# Patient Record
Sex: FEMALE | Race: WHITE | NOT HISPANIC OR LATINO | Employment: UNEMPLOYED | URBAN - METROPOLITAN AREA
[De-identification: names, ages, dates, MRNs, and addresses within clinical notes are randomized per-mention and may not be internally consistent; named-entity substitution may affect disease eponyms.]

---

## 2017-05-12 ENCOUNTER — APPOINTMENT (OUTPATIENT)
Dept: RADIOLOGY | Facility: MEDICAL CENTER | Age: 27
End: 2017-05-12
Attending: EMERGENCY MEDICINE
Payer: COMMERCIAL

## 2017-05-12 ENCOUNTER — HOSPITAL ENCOUNTER (EMERGENCY)
Facility: MEDICAL CENTER | Age: 27
End: 2017-05-12
Attending: EMERGENCY MEDICINE
Payer: COMMERCIAL

## 2017-05-12 VITALS
RESPIRATION RATE: 16 BRPM | TEMPERATURE: 99.3 F | OXYGEN SATURATION: 100 % | BODY MASS INDEX: 24.33 KG/M2 | WEIGHT: 155 LBS | HEIGHT: 67 IN | DIASTOLIC BLOOD PRESSURE: 104 MMHG | SYSTOLIC BLOOD PRESSURE: 145 MMHG | HEART RATE: 101 BPM

## 2017-05-12 DIAGNOSIS — F11.10 HEROIN ABUSE (HCC): ICD-10-CM

## 2017-05-12 DIAGNOSIS — T07.XXXA: ICD-10-CM

## 2017-05-12 LAB — HCG SERPL QL: NEGATIVE

## 2017-05-12 PROCEDURE — 70450 CT HEAD/BRAIN W/O DYE: CPT

## 2017-05-12 PROCEDURE — 72125 CT NECK SPINE W/O DYE: CPT

## 2017-05-12 PROCEDURE — 36415 COLL VENOUS BLD VENIPUNCTURE: CPT

## 2017-05-12 PROCEDURE — 84703 CHORIONIC GONADOTROPIN ASSAY: CPT

## 2017-05-12 PROCEDURE — 700105 HCHG RX REV CODE 258: Performed by: EMERGENCY MEDICINE

## 2017-05-12 PROCEDURE — 73120 X-RAY EXAM OF HAND: CPT | Mod: RT

## 2017-05-12 PROCEDURE — 700101 HCHG RX REV CODE 250: Performed by: EMERGENCY MEDICINE

## 2017-05-12 PROCEDURE — 96365 THER/PROPH/DIAG IV INF INIT: CPT

## 2017-05-12 PROCEDURE — 99285 EMERGENCY DEPT VISIT HI MDM: CPT

## 2017-05-12 PROCEDURE — 70486 CT MAXILLOFACIAL W/O DYE: CPT

## 2017-05-12 PROCEDURE — 700111 HCHG RX REV CODE 636 W/ 250 OVERRIDE (IP): Performed by: EMERGENCY MEDICINE

## 2017-05-12 PROCEDURE — 303747 HCHG EXTRA SUTURE

## 2017-05-12 PROCEDURE — 304999 HCHG REPAIR-SIMPLE/INTERMED LEVEL 1

## 2017-05-12 PROCEDURE — 304217 HCHG IRRIGATION SYSTEM

## 2017-05-12 RX ORDER — SODIUM CHLORIDE 9 MG/ML
1000 INJECTION, SOLUTION INTRAVENOUS ONCE
Status: COMPLETED | OUTPATIENT
Start: 2017-05-12 | End: 2017-05-12

## 2017-05-12 RX ORDER — AMPICILLIN AND SULBACTAM 2; 1 G/1; G/1
3 INJECTION, POWDER, FOR SOLUTION INTRAMUSCULAR; INTRAVENOUS ONCE
Status: COMPLETED | OUTPATIENT
Start: 2017-05-12 | End: 2017-05-12

## 2017-05-12 RX ORDER — AMOXICILLIN AND CLAVULANATE POTASSIUM 875; 125 MG/1; MG/1
1 TABLET, FILM COATED ORAL 2 TIMES DAILY
Qty: 14 TAB | Refills: 0 | Status: SHIPPED | OUTPATIENT
Start: 2017-05-12 | End: 2017-05-12

## 2017-05-12 RX ORDER — LIDOCAINE HYDROCHLORIDE 20 MG/ML
20 INJECTION, SOLUTION INFILTRATION; PERINEURAL ONCE
Status: COMPLETED | OUTPATIENT
Start: 2017-05-12 | End: 2017-05-12

## 2017-05-12 RX ORDER — AMOXICILLIN AND CLAVULANATE POTASSIUM 875; 125 MG/1; MG/1
1 TABLET, FILM COATED ORAL 2 TIMES DAILY
Qty: 14 TAB | Refills: 0 | Status: SHIPPED | OUTPATIENT
Start: 2017-05-12 | End: 2017-05-19

## 2017-05-12 RX ADMIN — LIDOCAINE HYDROCHLORIDE 20 ML: 20 INJECTION, SOLUTION INFILTRATION; PERINEURAL at 06:45

## 2017-05-12 RX ADMIN — SODIUM CHLORIDE 1000 ML: 9 INJECTION, SOLUTION INTRAVENOUS at 04:35

## 2017-05-12 RX ADMIN — AMPICILLIN SODIUM AND SULBACTAM SODIUM 3 G: 2; 1 INJECTION, POWDER, FOR SOLUTION INTRAMUSCULAR; INTRAVENOUS at 04:35

## 2017-05-12 ASSESSMENT — PAIN SCALES - GENERAL: PAINLEVEL_OUTOF10: 10
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
## 2017-05-12 NOTE — DISCHARGE INSTRUCTIONS
Laceration Care, Adult  A laceration is a cut that goes through all of the layers of the skin and into the tissue that is right under the skin. Some lacerations heal on their own. Others need to be closed with stitches (sutures), staples, skin adhesive strips, or skin glue. Proper laceration care minimizes the risk of infection and helps the laceration to heal better.  HOW TO CARE FOR YOUR LACERATION  If sutures or staples were used:  · Keep the wound clean and dry.  · If you were given a bandage (dressing), you should change it at least one time per day or as told by your health care provider. You should also change it if it becomes wet or dirty.  · Keep the wound completely dry for the first 24 hours or as told by your health care provider. After that time, you may shower or bathe. However, make sure that the wound is not soaked in water until after the sutures or staples have been removed.  · Clean the wound one time each day or as told by your health care provider:  ¨ Wash the wound with soap and water.  ¨ Rinse the wound with water to remove all soap.  ¨ Pat the wound dry with a clean towel. Do not rub the wound.  · After cleaning the wound, apply a thin layer of antibiotic ointment as told by your health care provider. This will help to prevent infection and keep the dressing from sticking to the wound.  · Have the sutures or staples removed as told by your health care provider.  If skin adhesive strips were used:  · Keep the wound clean and dry.  · If you were given a bandage (dressing), you should change it at least one time per day or as told by your health care provider. You should also change it if it becomes dirty or wet.  · Do not get the skin adhesive strips wet. You may shower or bathe, but be careful to keep the wound dry.  · If the wound gets wet, pat it dry with a clean towel. Do not rub the wound.  · Skin adhesive strips fall off on their own. You may trim the strips as the wound heals. Do not  remove skin adhesive strips that are still stuck to the wound. They will fall off in time.  If skin glue was used:  · Try to keep the wound dry, but you may briefly wet it in the shower or bath. Do not soak the wound in water, such as by swimming.  · After you have showered or bathed, gently pat the wound dry with a clean towel. Do not rub the wound.  · Do not do any activities that will make you sweat heavily until the skin glue has fallen off on its own.  · Do not apply liquid, cream, or ointment medicine to the wound while the skin glue is in place. Using those may loosen the film before the wound has healed.  · If you were given a bandage (dressing), you should change it at least one time per day or as told by your health care provider. You should also change it if it becomes dirty or wet.  · If a dressing is placed over the wound, be careful not to apply tape directly over the skin glue. Doing that may cause the glue to be pulled off before the wound has healed.  · Do not pick at the glue. The skin glue usually remains in place for 5-10 days, then it falls off of the skin.  General Instructions  · Take over-the-counter and prescription medicines only as told by your health care provider.  · If you were prescribed an antibiotic medicine or ointment, take or apply it as told by your doctor. Do not stop using it even if your condition improves.  · To help prevent scarring, make sure to cover your wound with sunscreen whenever you are outside after stitches are removed, after adhesive strips are removed, or when glue remains in place and the wound is healed. Make sure to wear a sunscreen of at least 30 SPF.  · Do not scratch or pick at the wound.  · Keep all follow-up visits as told by your health care provider. This is important.  · Check your wound every day for signs of infection. Watch for:  ¨ Redness, swelling, or pain.  ¨ Fluid, blood, or pus.  · Raise (elevate) the injured area above the level of your heart  while you are sitting or lying down, if possible.  SEEK MEDICAL CARE IF:  · You received a tetanus shot and you have swelling, severe pain, redness, or bleeding at the injection site.  · You have a fever.  · A wound that was closed breaks open.  · You notice a bad smell coming from your wound or your dressing.  · You notice something coming out of the wound, such as wood or glass.  · Your pain is not controlled with medicine.  · You have increased redness, swelling, or pain at the site of your wound.  · You have fluid, blood, or pus coming from your wound.  · You notice a change in the color of your skin near your wound.  · You need to change the dressing frequently due to fluid, blood, or pus draining from the wound.  · You develop a new rash.  · You develop numbness around the wound.  SEEK IMMEDIATE MEDICAL CARE IF:  · You develop severe swelling around the wound.  · Your pain suddenly increases and is severe.  · You develop painful lumps near the wound or on skin that is anywhere on your body.  · You have a red streak going away from your wound.  · The wound is on your hand or foot and you cannot properly move a finger or toe.  · The wound is on your hand or foot and you notice that your fingers or toes look pale or bluish.     This information is not intended to replace advice given to you by your health care provider. Make sure you discuss any questions you have with your health care provider.     Document Released: 12/18/2006 Document Revised: 05/03/2016 Document Reviewed: 12/14/2015  [email or web address] Interactive Patient Education ©2016 [email or web address]

## 2017-05-12 NOTE — ED AVS SNAPSHOT
5/12/2017    Liv Vanessa  [address]  [place] [address]    Dear Liv:    [facility] wants to ensure your discharge home is safe and you or your loved ones have had all of your questions answered regarding your care after you leave the hospital.    Below is a list of resources and contact information should you have any questions regarding your hospital stay, follow-up instructions, or active medical symptoms.    Questions or Concerns Regarding… Contact   Medical Questions Related to Your Discharge  (7 days a week, 8am-5pm) Contact a Nurse Care Coordinator   [phone]   Medical Questions Not Related to Your Discharge  (24 hours a day / 7 days a week)  Contact the Nurse Health Line   [phone]    Medications or Discharge Instructions Refer to your discharge packet   or contact your [facility] Primary Care Provider   [phone]   Follow-up Appointment(s) Schedule your appointment via [employer]   or contact Scheduling [phone]   Billing Review your statement via [employer]  or contact Billing [phone]   Medical Records Review your records via [employer]   or contact Medical Records [phone]     You may receive a telephone call within two days of discharge. This call is to make certain you understand your discharge instructions and have the opportunity to have any questions answered. You can also easily access your medical information, test results and upcoming appointments via the [employer] free online health management tool. You can learn more and sign up at [employer]. For assistance setting up your [employer] account, please call [phone].    Once again, we want to ensure your discharge home is safe and that you have a clear understanding of any next steps in your care. If you have any questions or concerns, please do not hesitate to contact us, we are here for you. Thank you for choosing [facility] for your healthcare needs.    Sincerely,    Your [facility] Healthcare Team
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
## 2017-05-12 NOTE — ED AVS SNAPSHOT
Datadecision Access Code: [email or web address]  Expires: 6/11/2017  8:41 AM    Your email address is not on file at [facility].  Email Addresses are required for you to sign up for Datade[employer]ision, please contact [phone] to verify your personal information and to provide your email address prior to attempting to register for Datadecision.    Liv Mendez  [address] [place] [address]    Datadecision  A secure, online tool to manage your health information     [facility]’s Datadecision® is a secure, online tool that connects you to your personalized health information from the privacy of your home -- day or night - making it very easy for you to manage your healthcare. Once the activation process is completed, you can even access your medical information using the Datadecision naa, which is available for free in the Apple Naa store or Google Play store.     To learn more about Datadecision, visit [email or web address]    There are two levels of access available (as shown below):   My Chart Features  [facility] Primary Care Doctor [facility]  Specialists [facility]  Urgent  Care Non-[facility] Primary Care Doctor   Email your healthcare team securely and privately 24/7 X X X    Manage appointments: schedule your next appointment; view details of past/upcoming appointments X      Request prescription refills. X      View recent personal medical records, including lab and immunizations X X X X   View health record, including health history, allergies, medications X X X X   Read reports about your outpatient visits, procedures, consult and ER notes X X X X   See your discharge summary, which is a recap of your hospital and/or ER visit that includes your diagnosis, lab results, and care plan X X  X     How to register for [employer]:  Once your e-mail address has been verified, follow the following steps to sign up for [employer].     1. Go to  [email or web address]  2. Click on the Sign Up Now box, which takes you to the New Member Sign Up page. You will  need to provide the following information:  a. Enter your [employer] Access Code exactly as it appears at the top of this page. (You will not need to use this code after you’ve completed the sign-up process. If you do not sign up before the expiration date, you must request a new code.)   b. Enter your date of birth.   c. Enter your home email address.   d. Click Submit, and follow the next screen’s instructions.  3. Create a Smart [place] ID. This will be your [employer] login ID and cannot be changed, so think of one that is secure and easy to remember.  4. Create a [employer] password. You can change your password at any time.  5. Enter your Password Reset Question and Answer. This can be used at a later time if you forget your password.   6. Enter your e-mail address. This allows you to receive e-mail notifications when new information is available in [employer].  7. Click Sign Up. You can now view your health information.    For assistance activating your [employer] account, call [phone]

## 2017-05-12 NOTE — ED NOTES
Pt given discharge instructions and Rx x1. Pt verbalized understanding. VSS no acute distress noted, pt able to ambulate without difficulty.

## 2017-05-12 NOTE — ED NOTES
[facility] to come and interview pt for intake. Eta 1 hr. Will continued to monitor. erp tech at bedside to clean and dress facial wounds.

## 2017-05-12 NOTE — ED NOTES
Still waiting for [facility] intake interview. Will discharge once completed.

## 2017-05-12 NOTE — ED NOTES
Assessment made. Seen by ERP. Bleeding controlled. IV placed. Blood drawn and sent to lab. Friends at bedside.

## 2017-05-12 NOTE — ED NOTES
Cleaned dried blood on hand and facial wound. Facial wound cleaned to the extend pt would bear due to pain to the touch.

## 2017-05-12 NOTE — ED NOTES
Pt's hand dressed with antibiotic ointment, 4x4 guaze, and keflex per Dr. Figueredo's instructions. Pt. Assisted to bathroom and was able to ambulate with steady gait. Life skills at bedside speaking with pt.
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
## 2017-09-25 ENCOUNTER — HOSPITAL ENCOUNTER (INPATIENT)
Facility: MEDICAL CENTER | Age: 27
LOS: 4 days | DRG: 603 | End: 2017-09-29
Attending: EMERGENCY MEDICINE | Admitting: INTERNAL MEDICINE
Payer: MEDICAID

## 2017-09-25 ENCOUNTER — APPOINTMENT (OUTPATIENT)
Dept: RADIOLOGY | Facility: MEDICAL CENTER | Age: 27
DRG: 603 | End: 2017-09-25
Attending: EMERGENCY MEDICINE
Payer: MEDICAID

## 2017-09-25 ENCOUNTER — RESOLUTE PROFESSIONAL BILLING HOSPITAL PROF FEE (OUTPATIENT)
Dept: HOSPITALIST | Facility: MEDICAL CENTER | Age: 27
End: 2017-09-25
Payer: MEDICAID

## 2017-09-25 DIAGNOSIS — F41.9 ANXIETY: ICD-10-CM

## 2017-09-25 DIAGNOSIS — H10.9 BACTERIAL CONJUNCTIVITIS OF RIGHT EYE: ICD-10-CM

## 2017-09-25 DIAGNOSIS — H05.011 ORBITAL CELLULITIS ON RIGHT: ICD-10-CM

## 2017-09-25 DIAGNOSIS — F32.A DEPRESSION, UNSPECIFIED DEPRESSION TYPE: ICD-10-CM

## 2017-09-25 PROBLEM — L03.213 CELLULITIS OF PERIORBITAL REGION OF BOTH EYES: Status: ACTIVE | Noted: 2017-09-25

## 2017-09-25 LAB
ANION GAP SERPL CALC-SCNC: 9 MMOL/L (ref 0–11.9)
BASOPHILS # BLD AUTO: 0.3 % (ref 0–1.8)
BASOPHILS # BLD: 0.03 K/UL (ref 0–0.12)
BUN SERPL-MCNC: 6 MG/DL (ref 8–22)
CALCIUM SERPL-MCNC: 9.2 MG/DL (ref 8.5–10.5)
CHLORIDE SERPL-SCNC: 100 MMOL/L (ref 96–112)
CO2 SERPL-SCNC: 24 MMOL/L (ref 20–33)
CREAT SERPL-MCNC: 0.6 MG/DL (ref 0.5–1.4)
EOSINOPHIL # BLD AUTO: 0.02 K/UL (ref 0–0.51)
EOSINOPHIL NFR BLD: 0.2 % (ref 0–6.9)
ERYTHROCYTE [DISTWIDTH] IN BLOOD BY AUTOMATED COUNT: 43.3 FL (ref 35.9–50)
GFR SERPL CREATININE-BSD FRML MDRD: >60 ML/MIN/1.73 M 2
GLUCOSE SERPL-MCNC: 147 MG/DL (ref 65–99)
GRAM STN SPEC: NORMAL
HCT VFR BLD AUTO: 40.7 % (ref 37–47)
HGB BLD-MCNC: 13.3 G/DL (ref 12–16)
IMM GRANULOCYTES # BLD AUTO: 0.04 K/UL (ref 0–0.11)
IMM GRANULOCYTES NFR BLD AUTO: 0.3 % (ref 0–0.9)
LYMPHOCYTES # BLD AUTO: 1.27 K/UL (ref 1–4.8)
LYMPHOCYTES NFR BLD: 10.8 % (ref 22–41)
MCH RBC QN AUTO: 27.4 PG (ref 27–33)
MCHC RBC AUTO-ENTMCNC: 32.7 G/DL (ref 33.6–35)
MCV RBC AUTO: 83.7 FL (ref 81.4–97.8)
MONOCYTES # BLD AUTO: 0.54 K/UL (ref 0–0.85)
MONOCYTES NFR BLD AUTO: 4.6 % (ref 0–13.4)
NEUTROPHILS # BLD AUTO: 9.83 K/UL (ref 2–7.15)
NEUTROPHILS NFR BLD: 83.8 % (ref 44–72)
NRBC # BLD AUTO: 0 K/UL
NRBC BLD AUTO-RTO: 0 /100 WBC
PLATELET # BLD AUTO: 286 K/UL (ref 164–446)
PMV BLD AUTO: 11.9 FL (ref 9–12.9)
POTASSIUM SERPL-SCNC: 3.1 MMOL/L (ref 3.6–5.5)
RBC # BLD AUTO: 4.86 M/UL (ref 4.2–5.4)
SIGNIFICANT IND 70042: NORMAL
SITE SITE: NORMAL
SODIUM SERPL-SCNC: 133 MMOL/L (ref 135–145)
SOURCE SOURCE: NORMAL
WBC # BLD AUTO: 11.7 K/UL (ref 4.8–10.8)

## 2017-09-25 PROCEDURE — 70480 CT ORBIT/EAR/FOSSA W/O DYE: CPT

## 2017-09-25 PROCEDURE — 87591 N.GONORRHOEAE DNA AMP PROB: CPT

## 2017-09-25 PROCEDURE — 96367 TX/PROPH/DG ADDL SEQ IV INF: CPT

## 2017-09-25 PROCEDURE — 700111 HCHG RX REV CODE 636 W/ 250 OVERRIDE (IP): Performed by: EMERGENCY MEDICINE

## 2017-09-25 PROCEDURE — 96375 TX/PRO/DX INJ NEW DRUG ADDON: CPT

## 2017-09-25 PROCEDURE — 96365 THER/PROPH/DIAG IV INF INIT: CPT

## 2017-09-25 PROCEDURE — 770006 HCHG ROOM/CARE - MED/SURG/GYN SEMI*

## 2017-09-25 PROCEDURE — 80048 BASIC METABOLIC PNL TOTAL CA: CPT

## 2017-09-25 PROCEDURE — 87491 CHLMYD TRACH DNA AMP PROBE: CPT

## 2017-09-25 PROCEDURE — 87389 HIV-1 AG W/HIV-1&-2 AB AG IA: CPT

## 2017-09-25 PROCEDURE — 99285 EMERGENCY DEPT VISIT HI MDM: CPT

## 2017-09-25 PROCEDURE — 87205 SMEAR GRAM STAIN: CPT

## 2017-09-25 PROCEDURE — 85025 COMPLETE CBC W/AUTO DIFF WBC: CPT

## 2017-09-25 PROCEDURE — 87070 CULTURE OTHR SPECIMN AEROBIC: CPT

## 2017-09-25 PROCEDURE — 700101 HCHG RX REV CODE 250: Performed by: EMERGENCY MEDICINE

## 2017-09-25 RX ORDER — CEFTRIAXONE 1 G/1
1 INJECTION, POWDER, FOR SOLUTION INTRAMUSCULAR; INTRAVENOUS ONCE
Status: COMPLETED | OUTPATIENT
Start: 2017-09-25 | End: 2017-09-25

## 2017-09-25 RX ORDER — CLINDAMYCIN PHOSPHATE 600 MG/50ML
600 INJECTION, SOLUTION INTRAVENOUS ONCE
Status: COMPLETED | OUTPATIENT
Start: 2017-09-25 | End: 2017-09-25

## 2017-09-25 RX ORDER — AMPICILLIN AND SULBACTAM 2; 1 G/1; G/1
3 INJECTION, POWDER, FOR SOLUTION INTRAMUSCULAR; INTRAVENOUS ONCE
Status: DISCONTINUED | OUTPATIENT
Start: 2017-09-25 | End: 2017-09-25

## 2017-09-25 RX ADMIN — HYDROMORPHONE HYDROCHLORIDE 1 MG: 1 INJECTION, SOLUTION INTRAMUSCULAR; INTRAVENOUS; SUBCUTANEOUS at 20:28

## 2017-09-25 RX ADMIN — CEFTRIAXONE SODIUM 1 G: 1 INJECTION, POWDER, FOR SOLUTION INTRAMUSCULAR; INTRAVENOUS at 23:51

## 2017-09-25 RX ADMIN — CLINDAMYCIN IN 5 PERCENT DEXTROSE 600 MG: 12 INJECTION, SOLUTION INTRAVENOUS at 22:08

## 2017-09-26 ENCOUNTER — APPOINTMENT (OUTPATIENT)
Dept: RADIOLOGY | Facility: MEDICAL CENTER | Age: 27
DRG: 603 | End: 2017-09-26
Attending: HOSPITALIST
Payer: MEDICAID

## 2017-09-26 ENCOUNTER — APPOINTMENT (OUTPATIENT)
Dept: RADIOLOGY | Facility: MEDICAL CENTER | Age: 27
DRG: 603 | End: 2017-09-26
Attending: ANESTHESIOLOGY
Payer: MEDICAID

## 2017-09-26 PROBLEM — E87.6 HYPOKALEMIA: Status: ACTIVE | Noted: 2017-09-26

## 2017-09-26 PROBLEM — Z72.0 TOBACCO USE: Status: ACTIVE | Noted: 2017-09-26

## 2017-09-26 PROBLEM — F11.10 HEROIN ABUSE (HCC): Chronic | Status: ACTIVE | Noted: 2017-09-26

## 2017-09-26 LAB
ALBUMIN SERPL BCP-MCNC: 3.5 G/DL (ref 3.2–4.9)
ALBUMIN/GLOB SERPL: 1 G/DL
ALP SERPL-CCNC: 135 U/L (ref 30–99)
ALT SERPL-CCNC: 24 U/L (ref 2–50)
ANION GAP SERPL CALC-SCNC: 9 MMOL/L (ref 0–11.9)
AST SERPL-CCNC: 28 U/L (ref 12–45)
BASOPHILS # BLD AUTO: 0.3 % (ref 0–1.8)
BASOPHILS # BLD: 0.03 K/UL (ref 0–0.12)
BILIRUB SERPL-MCNC: 0.9 MG/DL (ref 0.1–1.5)
BUN SERPL-MCNC: 6 MG/DL (ref 8–22)
CALCIUM SERPL-MCNC: 8.6 MG/DL (ref 8.5–10.5)
CHLORIDE SERPL-SCNC: 102 MMOL/L (ref 96–112)
CO2 SERPL-SCNC: 26 MMOL/L (ref 20–33)
CREAT SERPL-MCNC: 0.62 MG/DL (ref 0.5–1.4)
EOSINOPHIL # BLD AUTO: 0.12 K/UL (ref 0–0.51)
EOSINOPHIL NFR BLD: 1.3 % (ref 0–6.9)
ERYTHROCYTE [DISTWIDTH] IN BLOOD BY AUTOMATED COUNT: 43.2 FL (ref 35.9–50)
ETHANOL BLD-MCNC: 0 G/DL
GFR SERPL CREATININE-BSD FRML MDRD: >60 ML/MIN/1.73 M 2
GLOBULIN SER CALC-MCNC: 3.4 G/DL (ref 1.9–3.5)
GLUCOSE SERPL-MCNC: 106 MG/DL (ref 65–99)
HCG SERPL QL: NEGATIVE
HCT VFR BLD AUTO: 39.2 % (ref 37–47)
HGB BLD-MCNC: 13.1 G/DL (ref 12–16)
HIV 1+2 AB+HIV1 P24 AG SERPL QL IA: NON REACTIVE
IMM GRANULOCYTES # BLD AUTO: 0.04 K/UL (ref 0–0.11)
IMM GRANULOCYTES NFR BLD AUTO: 0.4 % (ref 0–0.9)
INR PPP: 1.1 (ref 0.87–1.13)
LYMPHOCYTES # BLD AUTO: 1.88 K/UL (ref 1–4.8)
LYMPHOCYTES NFR BLD: 19.6 % (ref 22–41)
MAGNESIUM SERPL-MCNC: 1.8 MG/DL (ref 1.5–2.5)
MCH RBC QN AUTO: 28.2 PG (ref 27–33)
MCHC RBC AUTO-ENTMCNC: 33.4 G/DL (ref 33.6–35)
MCV RBC AUTO: 84.5 FL (ref 81.4–97.8)
MONOCYTES # BLD AUTO: 0.59 K/UL (ref 0–0.85)
MONOCYTES NFR BLD AUTO: 6.2 % (ref 0–13.4)
NEUTROPHILS # BLD AUTO: 6.92 K/UL (ref 2–7.15)
NEUTROPHILS NFR BLD: 72.2 % (ref 44–72)
NRBC # BLD AUTO: 0 K/UL
NRBC BLD AUTO-RTO: 0 /100 WBC
PLATELET # BLD AUTO: 274 K/UL (ref 164–446)
PMV BLD AUTO: 12.1 FL (ref 9–12.9)
POTASSIUM SERPL-SCNC: 3.3 MMOL/L (ref 3.6–5.5)
PROT SERPL-MCNC: 6.9 G/DL (ref 6–8.2)
PROTHROMBIN TIME: 14.6 SEC (ref 12–14.6)
RBC # BLD AUTO: 4.64 M/UL (ref 4.2–5.4)
SODIUM SERPL-SCNC: 137 MMOL/L (ref 135–145)
TREPONEMA PALLIDUM IGG+IGM AB [PRESENCE] IN SERUM OR PLASMA BY IMMUNOASSAY: NON REACTIVE
WBC # BLD AUTO: 9.6 K/UL (ref 4.8–10.8)

## 2017-09-26 PROCEDURE — 700111 HCHG RX REV CODE 636 W/ 250 OVERRIDE (IP)

## 2017-09-26 PROCEDURE — A9270 NON-COVERED ITEM OR SERVICE: HCPCS | Performed by: ANESTHESIOLOGY

## 2017-09-26 PROCEDURE — 86780 TREPONEMA PALLIDUM: CPT

## 2017-09-26 PROCEDURE — 700102 HCHG RX REV CODE 250 W/ 637 OVERRIDE(OP): Performed by: ANESTHESIOLOGY

## 2017-09-26 PROCEDURE — 80307 DRUG TEST PRSMV CHEM ANLYZR: CPT

## 2017-09-26 PROCEDURE — A9270 NON-COVERED ITEM OR SERVICE: HCPCS | Performed by: HOSPITALIST

## 2017-09-26 PROCEDURE — 87040 BLOOD CULTURE FOR BACTERIA: CPT

## 2017-09-26 PROCEDURE — 85025 COMPLETE CBC W/AUTO DIFF WBC: CPT

## 2017-09-26 PROCEDURE — 700105 HCHG RX REV CODE 258

## 2017-09-26 PROCEDURE — 700105 HCHG RX REV CODE 258: Performed by: STUDENT IN AN ORGANIZED HEALTH CARE EDUCATION/TRAINING PROGRAM

## 2017-09-26 PROCEDURE — 700111 HCHG RX REV CODE 636 W/ 250 OVERRIDE (IP): Performed by: PHARMACIST

## 2017-09-26 PROCEDURE — 99223 1ST HOSP IP/OBS HIGH 75: CPT | Mod: GC | Performed by: INTERNAL MEDICINE

## 2017-09-26 PROCEDURE — 83735 ASSAY OF MAGNESIUM: CPT

## 2017-09-26 PROCEDURE — 700105 HCHG RX REV CODE 258: Performed by: PHARMACIST

## 2017-09-26 PROCEDURE — 36415 COLL VENOUS BLD VENIPUNCTURE: CPT

## 2017-09-26 PROCEDURE — 700111 HCHG RX REV CODE 636 W/ 250 OVERRIDE (IP): Performed by: STUDENT IN AN ORGANIZED HEALTH CARE EDUCATION/TRAINING PROGRAM

## 2017-09-26 PROCEDURE — 84703 CHORIONIC GONADOTROPIN ASSAY: CPT

## 2017-09-26 PROCEDURE — 770006 HCHG ROOM/CARE - MED/SURG/GYN SEMI*

## 2017-09-26 PROCEDURE — A9270 NON-COVERED ITEM OR SERVICE: HCPCS | Performed by: STUDENT IN AN ORGANIZED HEALTH CARE EDUCATION/TRAINING PROGRAM

## 2017-09-26 PROCEDURE — 70480 CT ORBIT/EAR/FOSSA W/O DYE: CPT

## 2017-09-26 PROCEDURE — 700102 HCHG RX REV CODE 250 W/ 637 OVERRIDE(OP): Performed by: STUDENT IN AN ORGANIZED HEALTH CARE EDUCATION/TRAINING PROGRAM

## 2017-09-26 PROCEDURE — 80053 COMPREHEN METABOLIC PANEL: CPT

## 2017-09-26 PROCEDURE — 700102 HCHG RX REV CODE 250 W/ 637 OVERRIDE(OP): Performed by: HOSPITALIST

## 2017-09-26 PROCEDURE — 85610 PROTHROMBIN TIME: CPT

## 2017-09-26 RX ORDER — AMOXICILLIN 250 MG
2 CAPSULE ORAL 2 TIMES DAILY
Status: DISCONTINUED | OUTPATIENT
Start: 2017-09-26 | End: 2017-09-29 | Stop reason: HOSPADM

## 2017-09-26 RX ORDER — BISACODYL 10 MG
10 SUPPOSITORY, RECTAL RECTAL
Status: DISCONTINUED | OUTPATIENT
Start: 2017-09-25 | End: 2017-09-29 | Stop reason: HOSPADM

## 2017-09-26 RX ORDER — SODIUM CHLORIDE 9 MG/ML
INJECTION, SOLUTION INTRAVENOUS CONTINUOUS
Status: DISCONTINUED | OUTPATIENT
Start: 2017-09-26 | End: 2017-09-29 | Stop reason: HOSPADM

## 2017-09-26 RX ORDER — POTASSIUM CHLORIDE 20 MEQ/1
40 TABLET, EXTENDED RELEASE ORAL ONCE
Status: COMPLETED | OUTPATIENT
Start: 2017-09-26 | End: 2017-09-26

## 2017-09-26 RX ORDER — ACETAMINOPHEN 325 MG/1
650 TABLET ORAL EVERY 6 HOURS PRN
Status: DISCONTINUED | OUTPATIENT
Start: 2017-09-26 | End: 2017-09-29 | Stop reason: HOSPADM

## 2017-09-26 RX ORDER — NICOTINE 21 MG/24HR
21 PATCH, TRANSDERMAL 24 HOURS TRANSDERMAL
Status: DISCONTINUED | OUTPATIENT
Start: 2017-09-26 | End: 2017-09-29 | Stop reason: HOSPADM

## 2017-09-26 RX ORDER — MORPHINE SULFATE 4 MG/ML
1 INJECTION, SOLUTION INTRAMUSCULAR; INTRAVENOUS EVERY 4 HOURS PRN
Status: DISCONTINUED | OUTPATIENT
Start: 2017-09-26 | End: 2017-09-29 | Stop reason: HOSPADM

## 2017-09-26 RX ORDER — POLYETHYLENE GLYCOL 3350 17 G/17G
1 POWDER, FOR SOLUTION ORAL
Status: DISCONTINUED | OUTPATIENT
Start: 2017-09-25 | End: 2017-09-29 | Stop reason: HOSPADM

## 2017-09-26 RX ORDER — CLINDAMYCIN PHOSPHATE 600 MG/50ML
600 INJECTION, SOLUTION INTRAVENOUS EVERY 8 HOURS
Status: DISCONTINUED | OUTPATIENT
Start: 2017-09-26 | End: 2017-09-26

## 2017-09-26 RX ORDER — OXYCODONE HYDROCHLORIDE 5 MG/1
5 TABLET ORAL EVERY 6 HOURS PRN
Status: DISCONTINUED | OUTPATIENT
Start: 2017-09-26 | End: 2017-09-29 | Stop reason: HOSPADM

## 2017-09-26 RX ADMIN — OXYCODONE HYDROCHLORIDE 5 MG: 5 TABLET ORAL at 23:52

## 2017-09-26 RX ADMIN — AMPICILLIN SODIUM AND SULBACTAM SODIUM 3 G: 2; 1 INJECTION, POWDER, FOR SOLUTION INTRAMUSCULAR; INTRAVENOUS at 18:07

## 2017-09-26 RX ADMIN — NICOTINE 21 MG: 21 PATCH, EXTENDED RELEASE TRANSDERMAL at 06:24

## 2017-09-26 RX ADMIN — MORPHINE SULFATE 1 MG: 4 INJECTION INTRAVENOUS at 19:10

## 2017-09-26 RX ADMIN — VANCOMYCIN HYDROCHLORIDE 700 MG: 100 INJECTION, POWDER, LYOPHILIZED, FOR SOLUTION INTRAVENOUS at 21:45

## 2017-09-26 RX ADMIN — POTASSIUM CHLORIDE 40 MEQ: 1500 TABLET, EXTENDED RELEASE ORAL at 09:47

## 2017-09-26 RX ADMIN — AMPICILLIN SODIUM AND SULBACTAM SODIUM 3 G: 2; 1 INJECTION, POWDER, FOR SOLUTION INTRAMUSCULAR; INTRAVENOUS at 22:58

## 2017-09-26 RX ADMIN — AMPICILLIN SODIUM AND SULBACTAM SODIUM 3 G: 2; 1 INJECTION, POWDER, FOR SOLUTION INTRAMUSCULAR; INTRAVENOUS at 01:12

## 2017-09-26 RX ADMIN — SODIUM CHLORIDE: 9 INJECTION, SOLUTION INTRAVENOUS at 01:12

## 2017-09-26 RX ADMIN — TOBRAMYCIN: 3 OINTMENT OPHTHALMIC at 22:59

## 2017-09-26 RX ADMIN — SODIUM CHLORIDE: 9 INJECTION, SOLUTION INTRAVENOUS at 22:58

## 2017-09-26 RX ADMIN — AMPICILLIN SODIUM AND SULBACTAM SODIUM 3 G: 2; 1 INJECTION, POWDER, FOR SOLUTION INTRAMUSCULAR; INTRAVENOUS at 12:47

## 2017-09-26 RX ADMIN — MORPHINE SULFATE 1 MG: 4 INJECTION INTRAVENOUS at 09:48

## 2017-09-26 RX ADMIN — AMPICILLIN SODIUM AND SULBACTAM SODIUM 3 G: 2; 1 INJECTION, POWDER, FOR SOLUTION INTRAMUSCULAR; INTRAVENOUS at 06:25

## 2017-09-26 RX ADMIN — MORPHINE SULFATE 1 MG: 4 INJECTION INTRAVENOUS at 14:40

## 2017-09-26 RX ADMIN — VANCOMYCIN HYDROCHLORIDE 1600 MG: 100 INJECTION, POWDER, LYOPHILIZED, FOR SOLUTION INTRAVENOUS at 14:40

## 2017-09-26 RX ADMIN — ACETAMINOPHEN 650 MG: 325 TABLET, FILM COATED ORAL at 01:12

## 2017-09-26 ASSESSMENT — ENCOUNTER SYMPTOMS
DOUBLE VISION: 0
SORE THROAT: 0
ABDOMINAL PAIN: 0
EYE PAIN: 1
HEADACHES: 0
EYE REDNESS: 1
FEVER: 0
EYE DISCHARGE: 1
CONSTIPATION: 0
MYALGIAS: 0
BLURRED VISION: 1
SPEECH CHANGE: 0
DIARRHEA: 0
COUGH: 0
SENSORY CHANGE: 0
CHILLS: 0
WHEEZING: 0
DEPRESSION: 0
VOMITING: 0
SPUTUM PRODUCTION: 0
WEAKNESS: 1
NAUSEA: 0
DIZZINESS: 0
SHORTNESS OF BREATH: 0
PHOTOPHOBIA: 0
FOCAL WEAKNESS: 0
PALPITATIONS: 0

## 2017-09-26 ASSESSMENT — PATIENT HEALTH QUESTIONNAIRE - PHQ9
2. FEELING DOWN, DEPRESSED, IRRITABLE, OR HOPELESS: NOT AT ALL
SUM OF ALL RESPONSES TO PHQ QUESTIONS 1-9: 0
SUM OF ALL RESPONSES TO PHQ9 QUESTIONS 1 AND 2: 0
1. LITTLE INTEREST OR PLEASURE IN DOING THINGS: NOT AT ALL

## 2017-09-26 ASSESSMENT — PAIN SCALES - GENERAL
PAINLEVEL_OUTOF10: 8
PAINLEVEL_OUTOF10: 3
PAINLEVEL_OUTOF10: 8
PAINLEVEL_OUTOF10: 8

## 2017-09-26 ASSESSMENT — LIFESTYLE VARIABLES
HOW MANY TIMES IN THE PAST YEAR HAVE YOU HAD 5 OR MORE DRINKS IN A DAY: 0
EVER HAD A DRINK FIRST THING IN THE MORNING TO STEADY YOUR NERVES TO GET RID OF A HANGOVER: NO
SUBSTANCE_ABUSE: 1
TOTAL SCORE: 0
TOTAL SCORE: 0
AVERAGE NUMBER OF DAYS PER WEEK YOU HAVE A DRINK CONTAINING ALCOHOL: 0
TOTAL SCORE: 0
CONSUMPTION TOTAL: NEGATIVE
HAVE YOU EVER FELT YOU SHOULD CUT DOWN ON YOUR DRINKING: NO
HAVE PEOPLE ANNOYED YOU BY CRITICIZING YOUR DRINKING: NO
EVER FELT BAD OR GUILTY ABOUT YOUR DRINKING: NO
ON A TYPICAL DAY WHEN YOU DRINK ALCOHOL HOW MANY DRINKS DO YOU HAVE: 1
ALCOHOL_USE: YES

## 2017-09-26 NOTE — SENIOR ADMIT NOTE
Senior admit note    Liv Mendez 27 y.o. female presented to the ED with complaint of bilateral eye swelling that started on Saturday. Patient went to [facility] and she was prescribed eye drops and she could not use it due to eye swelling. Today she went to [facility] again and she transferred to [facility] for further management. She noticed discharged from her right eye. Her has been getting worse so she decided to come to the hospital. Due to eye swelling she does not know that it affect her vision as she cannot open her eyes. Patient has pet gecko at home and it has eye infection. She denies any history of trauma, infeciton. She does not wear contact lenses. She denies any other symptoms. She uses heroin and she smokes it. She denies multiple sexual partner and expressed she has monogamous relationship for the last 6 months.       Physical Exam:    General: Not in acute distress  HEENT: Bilateral eye swelling. She can open her left eye but right eye she cannot open due to swelling. Erythema present around periorbital area.  Resp: Clear to auscultation B/L with no wheeze  CVS: Regular rate and rhythm   Abdomen: Soft non tender +BS  Neuro: No focal deficit, CN II-XII grossly intact      Vitals:    09/25/17 2030 09/25/17 2100 09/25/17 2130 09/25/17 2200   Pulse: 89 75 80 89   Resp:       Temp:       SpO2: 98% 94% 95% 97%   Weight:         [id number] W/O   Final Result      1.  RIGHT periorbital soft tissue swelling with possible fluid collection within the eyelid, concerning for abscess.   2.  No intraorbital/postseptal hemorrhage or inflammatory process.   3.  RIGHT forehead traversing noted.   4.  No orbital fracture.          Assessment and plan:    Patient denies any chronic medical condition. She smokes heroin and her last used was couple days back. Ordered eye discharge culture, blood culture, urine, pregnancy test, HIV.  EDP spoke with Dr. Burden (Opthalmology) and discussed CT scan finding  with him and he recommended clindamycin and Unasyn and he will evaluate patient.    As patient has pet gecko at home and It can transfer possible [email or web address] started her on Ceftriaxone along with Unasyn and clindamycin.     Code status: Full    DVT Prophylaxis:  Hold chemical DVT prophylaxis at this time as patient may need procedure.

## 2017-09-26 NOTE — NON-PROVIDER
Internal Medicine Medical Student Note    Name Liv Mendez       1990   Age/Sex 27 y.o. female   MRN [id number]   Code Status FULL     After 5PM or if no immediate response to page, please call for cross-coverage  Attending/Team: Dr. Andres/Salbador See Patient List for primary contact information  Call [phone] to page after hours   1st Call - Day Intern (R1):   Dr. Stein 2nd Call - Day Sr. Resident (R2/R3):   Dr. Jackson         Reason for interval visit  (Principal Problem)   Cellulitis of periorbital region of both eyes    Interval Problem Daily Status Update  (24 hours)   Patient reports eye pain has resolved. She also reports that her swelling has decreased but continues to have difficulty with eyelid movement. She continues to have green purulent discharge from her R eye and bilateral erythema and significant swelling. She can open her L eye slightly and does not report any difficulty with eye movement. Opthalmology consult to visit patient today.      Review of Systems   Constitutional: Negative for fever.   Eyes: Positive for pain and discharge (Green discharge from R eye).   Respiratory: Negative for cough, sputum production, shortness of breath and wheezing.    Cardiovascular: Negative for chest pain, palpitations and leg swelling.   Gastrointestinal: Negative for abdominal pain, constipation, diarrhea, nausea and vomiting.   Genitourinary: Negative for dysuria, frequency and urgency.   Neurological: Negative for headaches.               Physical Exam       Vitals:    17 0000 17 0025 17 0444 17 0900   BP:  124/83 124/83 119/75   Pulse: 75 88 76 85   Resp:  18 16 18   Temp:  37.1 °C (98.7 °F) 36.8 °C (98.2 °F) 36.5 °C (97.7 °F)   SpO2: 96% 98% 97% 94%   Weight:  62 kg (136 lb 11 oz)       Body mass index is 21.41 kg/m². Weight: 62 kg (136 lb 11 oz)  Oxygen Therapy:  Pulse Oximetry: 94 %, O2 (LPM): 0, O2 Delivery: None (Room Air)    Physical Exam   Constitutional:  She is oriented to person, place, and time and well-developed, well-nourished, and in no distress.   HENT:   Head: Normocephalic and atraumatic.   Eyes: Right eye exhibits discharge (Green discharge). Left conjunctiva is not injected. Left eye exhibits normal extraocular motion and no nystagmus.   Cardiovascular: Normal rate, regular rhythm, normal heart sounds and intact distal pulses.  Exam reveals no gallop and no friction rub.    No murmur heard.  Pulmonary/Chest: Effort normal and breath sounds normal. No respiratory distress. She has no wheezes. She has no rales.   Abdominal: Soft. Bowel sounds are normal. There is no tenderness. There is no rebound and no guarding.   Neurological: She is alert and oriented to person, place, and time.   Skin: There is erythema (Bilateral eyelid erythema and edema).   Psychiatric: Mood and affect normal.             Assessment/Plan     Liv Mendez is a 26 yo F with a hx of anxiety, depression, and heroin use who presents with bilateral eye pain and swelling and discharge from R eye. CT results support a diagnosis of periorbital cellulitis and preseptal abscess.    # Cellulitis of preseptal region of both eyes   - Orbital CT x2 showed R periorbital soft tissue swelling, R preseptal abscess, R forehead traversing   - R eye gram stain revealed rare gram neg diplococci, rare gram pos rods   - IV Unasyn and Vancomycin   - Concern for Salmonella infection due to pet gecko with eye infection at home   - WBC count trending down (11.7 > 9.6)   - Blood culture pending   - G/C, HIV, RPR pending   - Morphine 1 mg q4h PRN for pain control   - Opthalmology consulted and is visiting patient today    # Heroin abuse   - Last reported use was 4 days ago   - UDS and blood alcohol pending   - Continue to monitor for withdrawal symptoms    # Tobacco use   - Nicotine patch for nicotine withdrawal   - Educate on tobacco cessation      Dispo: Inpatient

## 2017-09-26 NOTE — ED PROVIDER NOTES
ER Provider Note     Scribed for Tu Davila M.D. by Tone Camarena. 9/25/2017, 7:55 PM.    Primary Care Provider: Trevon Cobian M.D.  Means of Arrival: EMS   History obtained from: Patient  History limited by: None     CHIEF COMPLAINT  Chief Complaint   Patient presents with   • Eye Swelling     Swelling both eyes, redness with white discharge right eye, transfer from [facility]       HPI  Liv Mendez is a 27 y.o. female who presents to the Emergency Department as a transfer from [facility] complaining of right eye pain, discharge, and swelling after discharge from [facility] on 9/23. Her swelling onset immediately after discharge and has increased in severity since. Any type of movement exacerbates her eye pain. Also, looking around with her left eye exacerbates the pain. I saw the patient during that visit and she did not have and pain or vision changes at that time with only mild itchiness. Patient had mild watery eye discharge with some purulence at that time. She was sent home with anitbiotic ointment and told to come back for worsening symptoms. She denies eye trauma, fever, chills, nausea, vomiting, or diarrhea today. She was reportedly incapable of using her eye antibiotic ointment secondary to the pain and swelling. Patient denies any promiscuous behavior recently.    REVIEW OF SYSTEMS  Pertinent positives include eye redness, discharge, and swelling. Pertinent negatives include no eye trauma, fever, chills, nausea, vomiting, or diarrhea. See HPI for further details. All other systems are negative.   C    PAST MEDICAL HISTORY   has a past medical history of Anxiety; CONCUSSION (1/8/2010); and Depression.    SURGICAL HISTORY   has a past surgical history that includes janna by laparoscopy (6/17/08).    SOCIAL HISTORY  Social History   Substance Use Topics   • Smoking status: Current Every Day Smoker     Packs/day: 0.50     Years: 3.00     Types: Cigarettes   • Smokeless tobacco: Never  Used      Comment: 1/2ppd   • Alcohol use No      Comment: soical      History   Drug Use No     Comment: Going to [employer] school        FAMILY HISTORY  Family History   Problem Relation Age of Onset   • Thyroid Mother    • Diabetes Mother    • Cancer Neg Hx    • Non-contributory Neg Hx    • Hyperlipidemia Neg Hx    • Hypertension Neg Hx        CURRENT MEDICATIONS  No current facility-administered medications on file prior to encounter.      No current outpatient prescriptions on file prior to encounter.       ALLERGIES  Allergies   Allergen Reactions   • Nkda [No Known Drug Allergy]        PHYSICAL EXAM  VITAL SIGNS: Pulse 96   Temp 37.3 °C (99.1 °F)   Resp 18   Wt 68 kg (150 lb)   SpO2 99%   BMI 23.49 kg/m²      Constitutional: Alert in no apparent distress.  HENT: No signs of trauma, Bilateral external ears normal, Nose normal.   Eyes: Left pupil reactive, Pain in the right eye with EOMs, Purulent discharge from the right eye with opening of the eyelid, Surrounding erythema of the right upper lid  And around the eye, Reactive pupil on the left, No other redness noted on her face. Non-icteric.  Neck: Normal range of motion, No tenderness, Supple, No stridor.   Lymphatic: No lymphadenopathy noted.   Cardiovascular: Regular rate and rhythm, no murmurs.   Thorax & Lungs: Normal breath sounds, No respiratory distress, No wheezing, No chest tenderness.   Abdomen: Bowel sounds normal, Soft, No tenderness, No masses, No pulsatile masses. No peritoneal signs.  Skin: Warm, Dry, No erythema, No rash.   Back: No bony tenderness, No CVA tenderness.   Extremities: Intact distal pulses, No edema, No tenderness, No cyanosis.  Musculoskeletal: Good range of motion in all major joints. No tenderness to palpation or major deformities noted.   Neurologic: Alert , Normal motor function, Normal sensory function, No focal deficits noted.   Psychiatric: Affect normal, Judgment normal, Mood normal.     DIAGNOSTIC STUDIES /  PROCEDURES    LABS  Results for orders placed or performed during the hospital encounter of 09/25/17   CBC WITH DIFFERENTIAL   Result Value Ref Range    WBC 11.7 (H) 4.8 - 10.8 K/uL    RBC 4.86 4.20 - 5.40 M/uL    Hemoglobin 13.3 12.0 - 16.0 g/dL    Hematocrit 40.7 37.0 - 47.0 %    MCV 83.7 81.4 - 97.8 fL    MCH 27.4 27.0 - 33.0 pg    MCHC 32.7 (L) 33.6 - 35.0 g/dL    RDW 43.3 35.9 - 50.0 fL    Platelet Count 286 164 - 446 K/uL    MPV 11.9 9.0 - 12.9 fL    Neutrophils-Polys 83.80 (H) 44.00 - 72.00 %    Lymphocytes 10.80 (L) 22.00 - 41.00 %    Monocytes 4.60 0.00 - 13.40 %    Eosinophils 0.20 0.00 - 6.90 %    Basophils 0.30 0.00 - 1.80 %    Immature Granulocytes 0.30 0.00 - 0.90 %    Nucleated RBC 0.00 /100 WBC    Neutrophils (Absolute) 9.83 (H) 2.00 - 7.15 K/uL    Lymphs (Absolute) 1.27 1.00 - 4.80 K/uL    Monos (Absolute) 0.54 0.00 - 0.85 K/uL    Eos (Absolute) 0.02 0.00 - 0.51 K/uL    Baso (Absolute) 0.03 0.00 - 0.12 K/uL    Immature Granulocytes (abs) 0.04 0.00 - 0.11 K/uL    NRBC (Absolute) 0.00 K/uL   BASIC METABOLIC PANEL   Result Value Ref Range    Sodium 133 (L) 135 - 145 mmol/L    Potassium 3.1 (L) 3.6 - 5.5 mmol/L    Chloride 100 96 - 112 mmol/L    Co2 24 20 - 33 mmol/L    Glucose 147 (H) 65 - 99 mg/dL    Bun 6 (L) 8 - 22 mg/dL    Creatinine 0.60 0.50 - 1.40 mg/dL    Calcium 9.2 8.5 - 10.5 mg/dL    Anion Gap 9.0 0.0 - 11.9   ESTIMATED GFR   Result Value Ref Range    GFR If African American >60 >60 mL/min/1.73 m 2    GFR If Non African American >60 >60 mL/min/1.73 m 2   CHLAMYDIA/GC AMP URINE OR SWAB   Result Value Ref Range    Source: Swab    GRAM STAIN ONLY   Result Value Ref Range    Significant Indicator NEG     Source EYE     Site Right Eye     Gram Stain Result       Many WBCs.  Rare Gram negative diplococci.  Rare Gram positive rods.       All labs reviewed by me.    RADIOLOGY  [id number] W/O   Final Result      1.  RIGHT periorbital soft tissue swelling with possible fluid collection within the  eyelid, concerning for abscess.   2.  No intraorbital/postseptal hemorrhage or inflammatory process.   3.  RIGHT forehead traversing noted.   4.  No orbital fracture.         The radiologist's interpretation of all radiological studies have been reviewed by me.    COURSE & MEDICAL DECISION MAKING  Pertinent Labs & Imaging studies reviewed. (See chart for details)    This is a 27 y.o. female that presents Discharge and swelling around the patient's right eye. The patient was seen 2 days prior and was found to have bacterial conjunctivitis given topical erythromycin at that time. At that time the patient denied any sexual contact or face or eye. At that time the patient was only complaining of itching without any pain. She returns now with purulent discharge from her eye and surrounding this. It is concerning for bacterial conjunctivitis now with preseptal versus orbital cellulitis. She does have pain with extraocular movements which makes a little cellulitis more likely. I will give the patient antibiotics as well as CT the patient's orbit and emergent consultation with ophthalmology. Basic labs ordered as well..     7:55 PM - Patient seen and examined at bedside. Ordered CT orbits sella without contrast, estimated GFR, CBC with differential, and BMP.  Patient will be medicated with Cleocin IVPB premix 600 mg and Dilaudid injection 1 mg for her symptoms.     8:01 PM I discussed the patient's case and the above findings with Dr. Burden (Opthalmology) who reccommended clindamycin and Unasyn and he will evaluate the patient.    10:40 PM Paged [facility].    10:45 PM I discussed the patient's case and the above findings with Dr. Reinoso ([facility]) who agrees to transfer care of the patient at this time. Ordered chlamydia/GC AMP urine or swab gram stain only to evaluate.    11:26 PM Review of laboratory results reveal revealed gram-negative diplococci in eye culture. Rocephin injection 1 g to  treat.  Ophthalmology is following along and agrees with this management plan. There is no evidence of post-septal hemorrhage or inflammatory process on the CT scan.    DISPOSITION:  Patient will be admitted to Dr. Reinoso, [facility], in guarded condition.    FINAL IMPRESSION  1. Anxiety    2. Depression, unspecified depression type    3. Orbital cellulitis on right    4. Bacterial conjunctivitis of right eye          Tone AGUILAR (Scribe), am scribing for, and in the presence of, Tu Davila M.D..    Electronically signed by: Tone Camarena (Scribe), 9/25/2017    ITu M.D. personally performed the services described in this documentation, as scribed by Tone Camarena in my presence, and it is both accurate and complete.     The note accurately reflects work and decisions made by me.  Tu Davila  9/26/2017  2:50 AM

## 2017-09-26 NOTE — H&P
"      Internal Medicine Admitting History and Physical    Name Liv Mendez       1990   Age/Sex 27 y.o. female   MRN [id number]   Code Status Full     After 5PM or if no immediate response to page, please call for cross-coverage  Attending/Team: Dr. Andres/ Salbador See Patient List for primary contact information  Call [phone] to page    1st Call - Day Intern (R1):   Dr. Stein 2nd Call - Day Sr. Resident (R2/R3):   Dr. Watkins        Chief Complaint:  Bilateral Eye pain, swelling and discharge    HPI:  Ms. Liv Mendez is a 27 year old female with a PMHx of anxiety, depression and substance abuse presents with bilateral eye pain, swelling and discharge. Swelling and discharge started Saturday first in the right eye but now involves both eyes. Patient presented Saturday to [facility] and was given a prescription for an ophthalmic antibiotic ointment but was not able to use it because of difficulty getting into the eye due to swelling and pain. She presents here day with worsening of the swelling and discharge. She denies fever, chills or myalgias but does endorse weakness, she also notes she has not eaten in 2 days because she cannot open her eyes to see what she is making or eating. She is unsure of the cause of her infection. She denies any recent sick contacts, any recent upper respiratory infections, hx of styes, history of trauma or allergies. She works in a retail store and denies any recent situation where she may have gotten something in her eye. She notes the day before the swelling she was experiencing some minor eye itch. She does not that she has a pet gecko which she handles regularly and it has a current eye infection. She denies any obvious blurry or double vision but states it is difficult to tell \"because I cannot open my eyes to see\".     Patient does note current heroin use, her last use of 4 days ago. She denies IV administration. She has had one sexual partner in the last 3 months and " denies any history of Chlamydia or Gonorrhea and denies any symptoms of dysuria, vaginal itching or discharge. She was tested for HIV several years ago but denies any recent testing .     Review of Systems   Constitutional: Negative for chills and fever.   HENT: Negative for congestion and sore throat.    Eyes: Positive for pain, discharge and redness. Negative for double vision and photophobia.   Respiratory: Negative for cough and shortness of breath.    Cardiovascular: Negative for chest pain, palpitations and leg swelling.   Gastrointestinal: Negative for abdominal pain, constipation, diarrhea, nausea and vomiting.   Genitourinary: Negative for dysuria, frequency and urgency.   Musculoskeletal: Negative for joint pain and myalgias.   Skin: Negative for itching and rash.        Red eyes with discharge   Neurological: Positive for weakness. Negative for dizziness and focal weakness.   Psychiatric/Behavioral: Positive for substance abuse. Negative for depression.             Past Medical History:   Past Medical History:   Diagnosis Date   • CONCUSSION 1/8/2010   • Anxiety    • Depression        Past Surgical History:  Past Surgical History:   Procedure Laterality Date   • DANIEL BY LAPAROSCOPY  6/17/08    Performed by APRIL BAUTISTA at SURGERY SAME DAY [facility] ORS       Current Outpatient Medications:  Home Medications     Reviewed by Emily Solomon (Pharmacy Tech) on 09/26/17 at 0017  Med List Status: Complete   Medication Last Dose Status        Patient Earl Taking any Medications                       Medication Allergy/Sensitivities:  Allergies   Allergen Reactions   • Nkda [No Known Drug Allergy]          Family History:  Family History   Problem Relation Age of Onset   • Thyroid Mother    • Diabetes Mother    • Cancer Neg Hx    • Non-contributory Neg Hx    • Hyperlipidemia Neg Hx    • Hypertension Neg Hx        Social History: Patient lives in [place], she works in retail. She smokes 1/2 pack of cigarettes  per day x 15 years. She denies alcohol. She uses heroin occasionally, her last use was 4 days ago and she denies IV drug use. She has been sexually active with one partner in the last 6 months.   Social History     Social History   • Marital status: Single     Spouse name: N/A   • Number of children: N/A   • Years of education: N/A     Occupational History   • Not on file.     Social History Main Topics   • Smoking status: Current Every Day Smoker     Packs/day: 0.50     Years: 3.00     Types: Cigarettes   • Smokeless tobacco: Never Used      Comment: 1/2ppd   • Alcohol use No      Comment: soical   • Drug use: No      Comment: Going to [employer] school    • Sexual activity: Not on file     Other Topics Concern   • Not on file     Social History Narrative   • No narrative on file     Living situation: Lives in [place]   PCP : Trevon Cobian M.D.      Physical Exam     Vitals:    09/25/17 2300 09/25/17 2330 09/26/17 0000 09/26/17 0025   BP:    124/83   Pulse: 74 76 75 88   Resp:    18   Temp:    37.1 °C (98.7 °F)   SpO2: 95% 95% 96% 98%   Weight:    62 kg (136 lb 11 oz)     Body mass index is 21.41 kg/m².  /83   Pulse 88   Temp 37.1 °C (98.7 °F)   Resp 18   Wt 62 kg (136 lb 11 oz)   SpO2 98%   BMI 21.41 kg/m²   O2 therapy: Pulse Oximetry: 98 %, O2 Delivery: None (Room Air)    Physical Exam   Constitutional: She is oriented to person, place, and time and well-developed, well-nourished, and in no distress. No distress.   HENT:   Head: Normocephalic and atraumatic.   Mouth/Throat: No oropharyngeal exudate.   Tongue has thick white  Plaque, no pain. Mild fluctuance in midline forehead    Eyes: EOM are normal. Pupils are equal, round, and reactive to light. Right eye exhibits discharge. Left eye exhibits discharge. No scleral icterus.   Bilateral periorbital swelling R>L, significant purulent discharge bilaterally R>L. Pain and erythema of both upper and lower lids bilaterally. Pain with EOM movement   Neck:  Neck supple. No JVD present.   Cardiovascular: Normal rate and regular rhythm.    No murmur heard.  Pulmonary/Chest: Effort normal and breath sounds normal. No respiratory distress. She has no wheezes.   Abdominal: Soft. Bowel sounds are normal. She exhibits no distension. There is no tenderness. There is no rebound.   Musculoskeletal: Normal range of motion. She exhibits no edema.   Lymphadenopathy:     She has no cervical adenopathy.   Neurological: She is alert and oriented to person, place, and time. No cranial nerve deficit.   Skin: Skin is warm and dry. No erythema.   Abrasion on Left lower lip margin          Data Review       Old Records Request:   Deferred  Current Records review and summary: Completed    Lab Data Review:  Recent Results (from the past 24 hour(s))   CBC WITH DIFFERENTIAL    Collection Time: 09/25/17  8:34 PM   Result Value Ref Range    WBC 11.7 (H) 4.8 - 10.8 K/uL    RBC 4.86 4.20 - 5.40 M/uL    Hemoglobin 13.3 12.0 - 16.0 g/dL    Hematocrit 40.7 37.0 - 47.0 %    MCV 83.7 81.4 - 97.8 fL    MCH 27.4 27.0 - 33.0 pg    MCHC 32.7 (L) 33.6 - 35.0 g/dL    RDW 43.3 35.9 - 50.0 fL    Platelet Count 286 164 - 446 K/uL    MPV 11.9 9.0 - 12.9 fL    Neutrophils-Polys 83.80 (H) 44.00 - 72.00 %    Lymphocytes 10.80 (L) 22.00 - 41.00 %    Monocytes 4.60 0.00 - 13.40 %    Eosinophils 0.20 0.00 - 6.90 %    Basophils 0.30 0.00 - 1.80 %    Immature Granulocytes 0.30 0.00 - 0.90 %    Nucleated RBC 0.00 /100 WBC    Neutrophils (Absolute) 9.83 (H) 2.00 - 7.15 K/uL    Lymphs (Absolute) 1.27 1.00 - 4.80 K/uL    Monos (Absolute) 0.54 0.00 - 0.85 K/uL    Eos (Absolute) 0.02 0.00 - 0.51 K/uL    Baso (Absolute) 0.03 0.00 - 0.12 K/uL    Immature Granulocytes (abs) 0.04 0.00 - 0.11 K/uL    NRBC (Absolute) 0.00 K/uL   BASIC METABOLIC PANEL    Collection Time: 09/25/17  8:34 PM   Result Value Ref Range    Sodium 133 (L) 135 - 145 mmol/L    Potassium 3.1 (L) 3.6 - 5.5 mmol/L    Chloride 100 96 - 112 mmol/L    Co2 24 20 -  33 mmol/L    Glucose 147 (H) 65 - 99 mg/dL    Bun 6 (L) 8 - 22 mg/dL    Creatinine 0.60 0.50 - 1.40 mg/dL    Calcium 9.2 8.5 - 10.5 mg/dL    Anion Gap 9.0 0.0 - 11.9   ESTIMATED GFR    Collection Time: 09/25/17  8:34 PM   Result Value Ref Range    GFR If African American >60 >60 mL/min/1.73 m 2    GFR If Non African American >60 >60 mL/min/1.73 m 2   CHLAMYDIA/GC AMP URINE OR SWAB    Collection Time: 09/25/17 10:13 PM   Result Value Ref Range    Source: Swab    GRAM STAIN ONLY    Collection Time: 09/25/17 10:48 PM   Result Value Ref Range    Significant Indicator NEG     Source EYE     Site Right Eye     Gram Stain Result       Many WBCs.  Rare Gram negative diplococci.  Rare Gram positive rods.         Imaging/Procedures Review:    ndependant Imaging Review: Completed  [id number] W/O   Final Result      1.  RIGHT periorbital soft tissue swelling with possible fluid collection within the eyelid, concerning for abscess.   2.  No intraorbital/postseptal hemorrhage or inflammatory process.   3.  RIGHT forehead traversing noted.   4.  No orbital fracture.           (X) Records reviewed and summarized in current documentation             Assessment/Plan    27 year old female with hx of substance abuse presents with 3 days of bilateral periorbital swelling, pain and discharge concerning for preorbital cellulitis vs conjunctivitis. Cause for infection is unknown however patients hx with substance use places patient at higher risk for infection, because of this HIV , Chlamydia and GC are being considered. Patient also has a pet reptile, handling can spread salmonella which can cause purulent conjunctivitis.     * Cellulitis of periorbital region of both eyes   Assessment & Plan    - CT Head: Rt periorbital soft tissue swelling with possible fluid collection within the eyelid, concerning for abscess. No intraorbital/postseptal hemorrhage or inflammatory process. Rt forehead traversing noted.  - Orbital secretion Cx  pending   - Rt eye gram stain: rare gram negative diplococci, rare gram positive rods  - Blood Cx pending   - Chlamydia/GC pending   - HIV pending   - WBC elevated at 11.7 with neutrophilic predominance, afebrile, non-tachy  - Cont. Unasyn and Ceftriaxone, de-escalate pending Cx results   - pt on SCD for DVT prophylaxis, held on Lovenox in case surgical intervention/procedure is necessary   - Morphine 1mg Q4H PRN for pain control  - Ophthalmology Dr. Burden consulted, appreciate recommendations         Heroin abuse- (present on admission)   Assessment & Plan    - hx of Heroin use, denies IV use  - last use 4 days ago   - watch for signs of withdrawal             Anticipated Hospital stay:  >2 midnights        Quality Measures    Reviewed items::  Labs reviewed, Medications reviewed and Radiology images reviewed  Hayes catheter::  No Hayes  DVT prophylaxis - mechanical:  SCDs  Antibiotics:  Treating active infection/contamination beyond 24 hours perioperative coverage

## 2017-09-26 NOTE — PROGRESS NOTES
Assumed care of pt at 0730am. Report received and bedside rounding completed with noc RN. Pt in bed c/o pain in BL eyes. Both edematous, red with discharge. No SOB, or in any acute distress. Fall precautions in place, pt verbalizing withdrawing from heroin, MD notified..Pt in bed calls for assistance  - . Call light and pt belongings within reach - pt makes needs known - hourly rounding in place. See flowsheets for further assessment.

## 2017-09-26 NOTE — CARE PLAN
Problem: Safety  Goal: Will remain free from injury    Intervention: Provide assistance with mobility  Call light with in reach, hourly rounding in place.       Problem: Pain Management  Goal: Pain level will decrease to patient's comfort goal    Intervention: Follow pain managment plan developed in collaboration with patient and Interdisciplinary Team  PRN pain medication given per MAR with good relief.

## 2017-09-26 NOTE — ED NOTES
Pt transferred by EMS from [facility] for orbital cellulitis since Saturday.  Pt has swelling in both eyes with redness and white discharge in right eye.

## 2017-09-26 NOTE — PROGRESS NOTES
Hallie Aranda Fall Risk Assessment:     Last Known Fall: No falls  Mobility: No limitations  Medications: No meds  Mental Status/LOC/Awareness: Awake, alert, and oriented to date, place, and person  Toileting Needs: No needs  Volume/Electrolyte Status: No problems  Communication/Sensory: No deficits  Behavior: Appropriate behavior  Hallie Aranda Fall Risk Total: 1  Fall Risk Level: NO RISK    Universal Fall Precautions:  call light/belongings in reach, bed in low position and locked, wheelchairs and assistive devices out of sight, siderails up x 2, use non-slip footwear, adequate lighting, clutter free and spill free environment, educate on level of risk, educate to call for assistance    Fall Risk Level Interventions:          Patient Specific Interventions:     Medication: review medications with patient and family  Mental Status/LOC/Awareness: reinforce falls education  Toileting: instruct patient/family on the need to call for assistance when toileting  Volume/Electrolyte Status: ensure IV fluids are appropriate  Communication/Sensory: update plan of care on whiteboard  Behavioral: instruct/reinforce fall program rationale  Mobility: dangle prior to standing

## 2017-09-26 NOTE — ASSESSMENT & PLAN NOTE
- Continued improved this morning. No purulent drainage noted on exam.    - Cx negative to date  - gram stain showed gram (-) diplococci and gram (+) rods. Culture not ordered with original gram stain. Culture ordered.   - Chlamydia negative /GC positive  - HIV and RPR nonreactive  - Ophthalmology consulted. Appreciate recs. Tobramycin drops. Will update ophthalmologist today.   - If patient worsens repeat CT and consult ENT.   - cont Ceftriaxone and flagyl. 1x 1G of Azithromycin on 9/27  - Opthalmology updated and happy with progress and okay for discharge as patient's swelling and pain has improved and her vision is beginning to improve as well.   - Pt to F/U with [facility] as outpatient early next week.

## 2017-09-26 NOTE — PROGRESS NOTES
Pt new admit from [facility] at 0027. Ambulatory. Right eye swollen, red, moderate pus draining, very tender to touch, unable to open eyes for assessment, saline flush and moist gauze applied for comfort. Left eye swollen but is able to open eye. Has small puss drainage as well. Plan of care reviewed with patient including labs, iv antibiotics and pain management. Verbalized understanding and agrees. Able to make needs nown.

## 2017-09-26 NOTE — PROGRESS NOTES
Pharmacy Kinetics 27 y.o. female on vancomycin day # 1   2017    Currently on Vancomycin 1600 mg IV x1 followed by 700 mg IV q8h    Indication for Treatment: periorbital cellulits    Pertinent history per medical record: Admitted on 2017 for swelling of both eyes with discharge. She was seen at [facility] for this on  and it has gotten worse since. She was started on broad spectrum abx and opthalmology consult placed.    Other antibiotics: Unasyn 3 g IV q6h    Allergies: Nkda [no known drug allergy]     List concerns for renal function: none at this time     Pertinent cultures to date:   None at this time    Recent Labs      17   0601   WBC  11.7*  9.6   NEUTSPOLYS  83.80*  72.20*     Recent Labs      17   0601   BUN  6*  6*   CREATININE  0.60  0.62   ALBUMIN   --   3.5     Intake/Output Summary (Last 24 hours) at 17 1632  Last data filed at 17 0030   Gross per 24 hour   Intake              100 ml   Output                0 ml   Net              100 ml      Blood pressure 119/75, pulse 85, temperature 36.5 °C (97.7 °F), resp. rate 18, weight 62 kg (136 lb 11 oz), SpO2 94 %. Temp (24hrs), Av.9 °C (98.4 °F), Min:36.5 °C (97.7 °F), Max:37.3 °C (99.1 °F)      A/P   1. Vancomycin dose change: new start  2. Next vancomycin level: tomorrow at 1330 (prior to 4th total dose)  3. Goal trough: 12-16 mcg/mL  4. Comments: No c/o accumulation, will start scheduled dosing and follow up level tomorrow. Await opthalmology recommendations.    Barry Garcia, PharmD, BCPS

## 2017-09-26 NOTE — PROGRESS NOTES
Internal Medicine Interval Note    Name Liv Mendez       1990   Age/Sex 27 y.o. female   MRN [id number]   Code Status FULL     After 5PM or if no immediate response to page, please call for cross-coverage  Attending/Team: Dr. Andres/heidi See Patient List for primary contact information  Call [phone] to page    1st Call - Day Intern (R1):   Emil 2nd Call - Day Sr. Resident (R2/R3):   Renetta          Reason for interval visit  (Principal Problem)   Cellulitis of periorbital region of both eyes    Interval Problem Daily Status Update  (24 hours)   Patient continues to have eye pain this morning. She is unable to open her right eye, however she is able to accurately identify the number of fingers being held up using her left eye. Per nursing patient states that she is starting to feel like she is withdrawaling from heroin. Patient started on oxycodone.       Review of Systems   Eyes: Positive for blurred vision, pain and discharge.   Respiratory: Negative for cough and shortness of breath.    Cardiovascular: Negative for chest pain and palpitations.   Gastrointestinal: Negative for abdominal pain, constipation, diarrhea, nausea and vomiting.   Genitourinary: Negative for dysuria and urgency.   Musculoskeletal: Negative for joint pain and myalgias.   Skin: Negative for itching and rash.   Neurological: Positive for weakness. Negative for sensory change and speech change.       Consultants/Specialty  Ophthamology    Disposition  Inpatient for periorbital cellulitis    Quality Measures    Reviewed items::  Labs reviewed, Medications reviewed and Radiology images reviewed  Hayes catheter::  No Hayes  DVT prophylaxis - mechanical:  SCDs  Ulcer Prophylaxis::  No  Antibiotics:  Treating active infection/contamination beyond 24 hours perioperative coverage          Physical Exam       Vitals:    17 0000 17 0025 17 0444 17 0900   BP:  124/83 124/83 119/75   Pulse: 75 88 76 85    Resp:  18 16 18   Temp:  37.1 °C (98.7 °F) 36.8 °C (98.2 °F) 36.5 °C (97.7 °F)   SpO2: 96% 98% 97% 94%   Weight:  62 kg (136 lb 11 oz)       Body mass index is 21.41 kg/m². Weight: 62 kg (136 lb 11 oz)  Oxygen Therapy:  Pulse Oximetry: 94 %, O2 (LPM): 0, O2 Delivery: None (Room Air)    Physical Exam   Constitutional: She is oriented to person, place, and time. Vital signs are normal. No distress.   Laying in bed.    HENT:   Head: Normocephalic and atraumatic.   Eyes: Right eye exhibits discharge. Left eye exhibits discharge.   Periorbital cellulitis worse on right. Erythema of right eyelid with significant purulent discharge. Patient unable to open right eye spontaneously. Left eye also has purulent discharge. Patient able to identify number of fingers held up with left eye.   Cardiovascular: Normal rate and regular rhythm.    Pulmonary/Chest: Effort normal and breath sounds normal.   Abdominal: Soft. She exhibits no distension. There is no tenderness.   Musculoskeletal: Normal range of motion. She exhibits no edema or tenderness.   Neurological: She is oriented to person, place, and time.         Lab Data Review:         9/26/2017  1:36 PM    Recent Labs      09/25/17 2034 09/26/17   0601   SODIUM  133*  137   POTASSIUM  3.1*  3.3*   CHLORIDE  100  102   CO2  24  26   BUN  6*  6*   CREATININE  0.60  0.62   MAGNESIUM   --   1.8   CALCIUM  9.2  8.6       Recent Labs      09/25/17 2034 09/26/17   0601   ALTSGPT   --   24   ASTSGOT   --   28   ALKPHOSPHAT   --   135*   TBILIRUBIN   --   0.9   GLUCOSE  147*  106*       Recent Labs      09/25/17 2034 09/26/17   0601  09/26/17   0802   RBC  4.86  4.64   --    HEMOGLOBIN  13.3  13.1   --    HEMATOCRIT  40.7  39.2   --    PLATELETCT  286  274   --    PROTHROMBTM   --    --   14.6   INR   --    --   1.10       Recent Labs      09/25/17 2034 09/26/17   0601   WBC  11.7*  9.6   NEUTSPOLYS  83.80*  72.20*   LYMPHOCYTES  10.80*  19.60*   MONOCYTES  4.60  6.20    EOSINOPHILS  0.20  1.30   BASOPHILS  0.30  0.30   ASTSGOT   --   28   ALTSGPT   --   24   ALKPHOSPHAT   --   135*   TBILIRUBIN   --   0.9           Assessment/Plan     * Cellulitis of periorbital region of both eyes- (present on admission)   Assessment & Plan    - CT Head: Rt periorbital soft tissue swelling with possible fluid collection within the eyelid. Improved somewhat on repeat CT.   - Cx pending   - Chlamydia/GC pending   - HIV and RPR nonreactive  - Unasyn and Vanco, de-escalate pending Cx results   - supportive care with warm compress   - Ophthalmology consulted- patient is improving, continue to watch for now. Tobramycin drops. If patient worsens repeat CT and consult ENT.   - Will update ophthalmologist on pt progress on Thursday        Hypokalemia- (present on admission)   Assessment & Plan    replete as needed         Tobacco use- (present on admission)   Assessment & Plan    - Nicotine patches ordered  - educate patient on tobacco cessation        Heroin abuse- (present on admission)   Assessment & Plan    - hx of Heroin use, denies IV use  - last use 4 days ago   - pt feels that she is starting to withdrawal  - pt on oxycodone 5mg and morphine IV

## 2017-09-26 NOTE — ASSESSMENT & PLAN NOTE
- hx of Heroin use, denies IV use  - last use 4 days ago   - pt on oxycodone 5mg and morphine IV  -  on cessation

## 2017-09-26 NOTE — ED NOTES
Med rec updated and complete.  Allergies reviewed.  Pt denies antibiotics in last 30 days.  Pt denies taking prescription medications.

## 2017-09-27 LAB
ALBUMIN SERPL BCP-MCNC: 3 G/DL (ref 3.2–4.9)
AMPHET UR QL SCN: NEGATIVE
APPEARANCE UR: ABNORMAL
BACTERIA #/AREA URNS HPF: NEGATIVE /HPF
BARBITURATES UR QL SCN: NEGATIVE
BASOPHILS # BLD AUTO: 0.3 % (ref 0–1.8)
BASOPHILS # BLD: 0.02 K/UL (ref 0–0.12)
BENZODIAZ UR QL SCN: NEGATIVE
BILIRUB UR QL STRIP.AUTO: NEGATIVE
BUN SERPL-MCNC: 8 MG/DL (ref 8–22)
BZE UR QL SCN: NEGATIVE
CALCIUM SERPL-MCNC: 8.4 MG/DL (ref 8.5–10.5)
CANNABINOIDS UR QL SCN: NEGATIVE
CAOX CRY #/AREA URNS HPF: ABNORMAL /HPF
CHLORIDE SERPL-SCNC: 109 MMOL/L (ref 96–112)
CO2 SERPL-SCNC: 23 MMOL/L (ref 20–33)
COLOR UR: ABNORMAL
CREAT SERPL-MCNC: 0.59 MG/DL (ref 0.5–1.4)
EOSINOPHIL # BLD AUTO: 0.14 K/UL (ref 0–0.51)
EOSINOPHIL NFR BLD: 1.9 % (ref 0–6.9)
EPI CELLS #/AREA URNS HPF: ABNORMAL /HPF
ERYTHROCYTE [DISTWIDTH] IN BLOOD BY AUTOMATED COUNT: 44.9 FL (ref 35.9–50)
GFR SERPL CREATININE-BSD FRML MDRD: >60 ML/MIN/1.73 M 2
GLUCOSE SERPL-MCNC: 105 MG/DL (ref 65–99)
GLUCOSE UR STRIP.AUTO-MCNC: NEGATIVE MG/DL
HCT VFR BLD AUTO: 36.4 % (ref 37–47)
HGB BLD-MCNC: 12.1 G/DL (ref 12–16)
HYALINE CASTS #/AREA URNS LPF: ABNORMAL /LPF
IMM GRANULOCYTES # BLD AUTO: 0.03 K/UL (ref 0–0.11)
IMM GRANULOCYTES NFR BLD AUTO: 0.4 % (ref 0–0.9)
KETONES UR STRIP.AUTO-MCNC: NEGATIVE MG/DL
LEUKOCYTE ESTERASE UR QL STRIP.AUTO: ABNORMAL
LYMPHOCYTES # BLD AUTO: 1.87 K/UL (ref 1–4.8)
LYMPHOCYTES NFR BLD: 25.2 % (ref 22–41)
MCH RBC QN AUTO: 28.8 PG (ref 27–33)
MCHC RBC AUTO-ENTMCNC: 33.2 G/DL (ref 33.6–35)
MCV RBC AUTO: 86.7 FL (ref 81.4–97.8)
METHADONE UR QL SCN: NEGATIVE
MICRO URNS: ABNORMAL
MONOCYTES # BLD AUTO: 0.5 K/UL (ref 0–0.85)
MONOCYTES NFR BLD AUTO: 6.7 % (ref 0–13.4)
NEUTROPHILS # BLD AUTO: 4.86 K/UL (ref 2–7.15)
NEUTROPHILS NFR BLD: 65.5 % (ref 44–72)
NITRITE UR QL STRIP.AUTO: NEGATIVE
NRBC # BLD AUTO: 0 K/UL
NRBC BLD AUTO-RTO: 0 /100 WBC
OPIATES UR QL SCN: POSITIVE
OXYCODONE UR QL SCN: POSITIVE
PCP UR QL SCN: NEGATIVE
PH UR STRIP.AUTO: 7 [PH]
PHOSPHATE SERPL-MCNC: 2.4 MG/DL (ref 2.5–4.5)
PLATELET # BLD AUTO: 247 K/UL (ref 164–446)
PMV BLD AUTO: 12.1 FL (ref 9–12.9)
POTASSIUM SERPL-SCNC: 3.9 MMOL/L (ref 3.6–5.5)
PROPOXYPH UR QL SCN: NEGATIVE
PROT UR QL STRIP: NEGATIVE MG/DL
RBC # BLD AUTO: 4.2 M/UL (ref 4.2–5.4)
RBC # URNS HPF: ABNORMAL /HPF
RBC UR QL AUTO: NEGATIVE
SODIUM SERPL-SCNC: 138 MMOL/L (ref 135–145)
SP GR UR STRIP.AUTO: 1.01
TRICHOMONAS #/AREA URNS HPF: ABNORMAL /HPF
WBC # BLD AUTO: 7.4 K/UL (ref 4.8–10.8)
WBC #/AREA URNS HPF: ABNORMAL /HPF

## 2017-09-27 PROCEDURE — 80307 DRUG TEST PRSMV CHEM ANLYZR: CPT

## 2017-09-27 PROCEDURE — 80069 RENAL FUNCTION PANEL: CPT

## 2017-09-27 PROCEDURE — A9270 NON-COVERED ITEM OR SERVICE: HCPCS | Performed by: HOSPITALIST

## 2017-09-27 PROCEDURE — 700105 HCHG RX REV CODE 258: Performed by: STUDENT IN AN ORGANIZED HEALTH CARE EDUCATION/TRAINING PROGRAM

## 2017-09-27 PROCEDURE — 770006 HCHG ROOM/CARE - MED/SURG/GYN SEMI*

## 2017-09-27 PROCEDURE — 700105 HCHG RX REV CODE 258

## 2017-09-27 PROCEDURE — A9270 NON-COVERED ITEM OR SERVICE: HCPCS | Performed by: ANESTHESIOLOGY

## 2017-09-27 PROCEDURE — 700101 HCHG RX REV CODE 250: Performed by: ANESTHESIOLOGY

## 2017-09-27 PROCEDURE — 700102 HCHG RX REV CODE 250 W/ 637 OVERRIDE(OP): Performed by: ANESTHESIOLOGY

## 2017-09-27 PROCEDURE — 700111 HCHG RX REV CODE 636 W/ 250 OVERRIDE (IP): Performed by: STUDENT IN AN ORGANIZED HEALTH CARE EDUCATION/TRAINING PROGRAM

## 2017-09-27 PROCEDURE — 700111 HCHG RX REV CODE 636 W/ 250 OVERRIDE (IP): Performed by: ANESTHESIOLOGY

## 2017-09-27 PROCEDURE — 85025 COMPLETE CBC W/AUTO DIFF WBC: CPT

## 2017-09-27 PROCEDURE — 36415 COLL VENOUS BLD VENIPUNCTURE: CPT

## 2017-09-27 PROCEDURE — 700102 HCHG RX REV CODE 250 W/ 637 OVERRIDE(OP): Performed by: HOSPITALIST

## 2017-09-27 PROCEDURE — 99232 SBSQ HOSP IP/OBS MODERATE 35: CPT | Mod: GC | Performed by: INTERNAL MEDICINE

## 2017-09-27 PROCEDURE — 81001 URINALYSIS AUTO W/SCOPE: CPT

## 2017-09-27 PROCEDURE — 700102 HCHG RX REV CODE 250 W/ 637 OVERRIDE(OP): Performed by: STUDENT IN AN ORGANIZED HEALTH CARE EDUCATION/TRAINING PROGRAM

## 2017-09-27 PROCEDURE — 700111 HCHG RX REV CODE 636 W/ 250 OVERRIDE (IP)

## 2017-09-27 PROCEDURE — A9270 NON-COVERED ITEM OR SERVICE: HCPCS | Performed by: STUDENT IN AN ORGANIZED HEALTH CARE EDUCATION/TRAINING PROGRAM

## 2017-09-27 RX ORDER — AZITHROMYCIN 250 MG/1
1000 TABLET, FILM COATED ORAL ONCE
Status: COMPLETED | OUTPATIENT
Start: 2017-09-27 | End: 2017-09-27

## 2017-09-27 RX ADMIN — TOBRAMYCIN: 3 OINTMENT OPHTHALMIC at 17:17

## 2017-09-27 RX ADMIN — METRONIDAZOLE 500 MG: 500 INJECTION, SOLUTION INTRAVENOUS at 21:40

## 2017-09-27 RX ADMIN — MORPHINE SULFATE 1 MG: 4 INJECTION INTRAVENOUS at 17:46

## 2017-09-27 RX ADMIN — AMPICILLIN SODIUM AND SULBACTAM SODIUM 3 G: 2; 1 INJECTION, POWDER, FOR SOLUTION INTRAMUSCULAR; INTRAVENOUS at 09:04

## 2017-09-27 RX ADMIN — MORPHINE SULFATE 1 MG: 4 INJECTION INTRAVENOUS at 12:57

## 2017-09-27 RX ADMIN — SODIUM CHLORIDE: 9 INJECTION, SOLUTION INTRAVENOUS at 17:13

## 2017-09-27 RX ADMIN — CEFTRIAXONE 2 G: 2 INJECTION, SOLUTION INTRAVENOUS at 12:56

## 2017-09-27 RX ADMIN — VANCOMYCIN HYDROCHLORIDE 700 MG: 100 INJECTION, POWDER, LYOPHILIZED, FOR SOLUTION INTRAVENOUS at 06:36

## 2017-09-27 RX ADMIN — DIBASIC SODIUM PHOSPHATE, MONOBASIC POTASSIUM PHOSPHATE AND MONOBASIC SODIUM PHOSPHATE 1 TABLET: 852; 155; 130 TABLET ORAL at 21:40

## 2017-09-27 RX ADMIN — NICOTINE 21 MG: 21 PATCH, EXTENDED RELEASE TRANSDERMAL at 06:36

## 2017-09-27 RX ADMIN — TOBRAMYCIN: 3 OINTMENT OPHTHALMIC at 21:41

## 2017-09-27 RX ADMIN — OXYCODONE HYDROCHLORIDE 5 MG: 5 TABLET ORAL at 09:06

## 2017-09-27 RX ADMIN — METRONIDAZOLE 500 MG: 500 INJECTION, SOLUTION INTRAVENOUS at 14:36

## 2017-09-27 RX ADMIN — OXYCODONE HYDROCHLORIDE 5 MG: 5 TABLET ORAL at 15:54

## 2017-09-27 RX ADMIN — AZITHROMYCIN 1000 MG: 250 TABLET, FILM COATED ORAL at 14:37

## 2017-09-27 RX ADMIN — STANDARDIZED SENNA CONCENTRATE AND DOCUSATE SODIUM 2 TABLET: 8.6; 5 TABLET, FILM COATED ORAL at 09:04

## 2017-09-27 ASSESSMENT — ENCOUNTER SYMPTOMS
BLURRED VISION: 1
CHILLS: 0
MYALGIAS: 0
COUGH: 0
EYE DISCHARGE: 0
SHORTNESS OF BREATH: 0
CONSTIPATION: 0
EYE PAIN: 1
DIARRHEA: 0
PALPITATIONS: 0
VOMITING: 0
TINGLING: 0
EYE DISCHARGE: 1
FEVER: 0
SENSORY CHANGE: 0
NAUSEA: 0
SPEECH CHANGE: 0
FOCAL WEAKNESS: 0
ABDOMINAL PAIN: 0

## 2017-09-27 ASSESSMENT — PAIN SCALES - GENERAL
PAINLEVEL_OUTOF10: 6
PAINLEVEL_OUTOF10: 7
PAINLEVEL_OUTOF10: 5

## 2017-09-27 NOTE — CARE PLAN
Problem: Communication  Goal: The ability to communicate needs accurately and effectively will improve    Intervention: Reorient patient to environment as needed  Discussed POC, pt communicates questions and poc well. Pt and family understand poc.      Problem: Safety  Goal: Will remain free from injury  Educated on safety measures, using call light ect    Problem: Pain Management  Goal: Pain level will decrease to patient's comfort goal  Pt states pain management plan working for them, pain controlled

## 2017-09-27 NOTE — PROGRESS NOTES
Pt sleeping most of shift so far, pain controlled at this moment. Giving iv antibiotics and eye care.

## 2017-09-27 NOTE — NON-PROVIDER
Internal Medicine Medical Student Note    Name Liv Mendez       1990   Age/Sex 27 y.o. female   MRN [id number]   Code Status FULL     After 5PM or if no immediate response to page, please call for cross-coverage  Attending/Team: Dr. Andres/Salbador See Patient List for primary contact information  Call [phone] to page after hours   1st Call - Day Intern (R1):   Dr. Stein 2nd Call - Day Sr. Resident (R2/R3):   Dr. Jackson         Reason for interval visit  (Principal Problem)   Cellulitis of periorbital region of both eyes    Interval Problem Daily Status Update  (24 hours)   The patient reports improved symptoms overnight and has no new complaints at this time. She reports decreased swelling and discharge bilaterally. She has preseptal pain in her R eye but reports pain medication is controlling the pain well. Her vital signs were stable overnight.    Opthalmology visited the patient yesterday and completed a repeat CT scan which demonstrated no evidence of post-septal expansion and improvement in fluid collection in R eye. Her blurred vision in the R eye is thought to be due to corneal irritation per opthalmology and will be reevaluated when edema has resolved. She will continue opthalmology outpatient.    Review of Systems   Constitutional: Negative for chills and fever.   Eyes: Positive for blurred vision (R eye), pain (Preseptal pain in R eye) and discharge (R eye).   Respiratory: Negative for cough.    Gastrointestinal: Negative for abdominal pain, constipation and diarrhea.   Genitourinary:        Denies vaginal discharge or irritation   Neurological: Negative for tingling, sensory change and focal weakness.               Physical Exam       Vitals:    17 1600 17 1921 17 0400 17 0755   BP: 115/79 120/79 103/61 133/85   Pulse: 81 73 79 89   Resp: 18 18 16 16   Temp: 37.2 °C (99 °F) 36.8 °C (98.3 °F) 37 °C (98.6 °F) 36.4 °C (97.5 °F)   SpO2:  100% 96% 95%   Weight:          Body mass index is 21.41 kg/m².    Oxygen Therapy:  Pulse Oximetry: 95 %, O2 (LPM): 0, O2 Delivery: None (Room Air)    Physical Exam   Constitutional: She is oriented to person, place, and time and well-developed, well-nourished, and in no distress.   Eyes: EOM are normal. Pupils are equal, round, and reactive to light. Right eye exhibits discharge. Left eye exhibits no discharge. Left conjunctiva is not injected.   Cardiovascular: Normal rate and regular rhythm.  Exam reveals no gallop and no friction rub.    Murmur heard.  Pulmonary/Chest: Effort normal and breath sounds normal. No respiratory distress. She has no wheezes. She has no rales.   Abdominal: Soft. Bowel sounds are normal. There is no tenderness.   Neurological: She is alert and oriented to person, place, and time.             Assessment/Plan     Liv Mendez is a 28 yo F with a hx of anxiety and heroin use who presents with bilateral eye pain, swelling, and discharge. CT results support a diagnosis of periorbital cellulitis and preseptal abscess.    # Cellulitis of periorbital region of both eyes   - CT head showed no evidence of post-septal expansion. Decreased edema and inflammation in preseptal soft tissues. R preseptal fluid collection decreased in size and now contains gas (likely due to instrumentation vs organism)   - R eye secretion gram stain showed rare gram neg diplococci and rare gram pos rods   - Gonorrhea pos, chlamydia neg   - Blood culture showed no growth   - HIV and RPR nonreactive   - Ceftriaxone/ Flagyl/ Azithromycin   - Warm compress BID   - Tobramycin drops   - Blurry vision in R eye to be reassessed when edema resolves    # Hypokalemia   - Resolved (3.1 > 3.3 > 3.9)   - Replete as needed    # Tobacco use   - Nicotine patches ordered   - Education on tobacco cessation    # Heroin abuse   - Hx of smoking heroin, denies IV use   - Last use 6 days ago per patient   - Pt on oxycodone 5 mg and morphine IV for withdrawal  symptoms      Dispo: Inpatient

## 2017-09-27 NOTE — PROGRESS NOTES
Hallie Aranda Fall Risk Assessment:     Last Known Fall: No falls  Mobility: No limitations  Medications: No meds  Mental Status/LOC/Awareness: Awake, alert, and oriented to date, place, and person  Toileting Needs: No needs  Volume/Electrolyte Status: No problems  Communication/Sensory: Blindness or recent visual change  Behavior: Appropriate behavior  Hallie Aranda Fall Risk Total: 5  Fall Risk Level: NO RISK    Universal Fall Precautions:  call light/belongings in reach, bed in low position and locked, wheelchairs and assistive devices out of sight, siderails up x 2, use non-slip footwear, adequate lighting, clutter free and spill free environment, educate on level of risk, educate to call for assistance    Fall Risk Level Interventions:          Patient Specific Interventions:     Medication: not applicable  Mental Status/LOC/Awareness: not applicable  Toileting: not applicable  Volume/Electrolyte Status: not applicable  Communication/Sensory: not applicable  Behavioral: not applicable  Mobility: not applicable

## 2017-09-27 NOTE — CONSULTS
28 yo female with redness, pain, swelling and discharge from her right eye. Started on Saturday, getting worse initially, but since started Abx is getting better.  Blurry vision. No h/o of trauma or bug bites. She does not wear contact lenses. She denies any other symptoms. She uses heroin and she smokes it.  Left eye is doing well.     Physical Exam:     General: Not in acute distress  HEENT: Bilateral eye swelling. She can open her left eye but right eye she cannot open due to swelling. Erythema present around periorbital area.  Resp: Clear to auscultation B/L with no wheeze  CVS: Regular rate and rhythm   Abdomen: Soft non tender +BS  Neuro: No focal deficit, CN II-XII grossly intact    Allergies: NKDA    CT Orbit 09/26/2017  1.  Overall, edema and inflammatory change in the preseptal soft tissues appears decreased. There is no evidence of post septal extension.  2.   Previously demonstrated small fluid collection anterior to the right orbit appears somewhat smaller and now contains a small focus of gas, possibly related to instrumentation versus gas producing organism         Exam:  VA sc near card HM OD 20/30 OS    Some restriction in all directions OD Full EOM OS  No APD OU  Full VF CF OU     Exam     OD:  +3 swelling upper and lower eyelid  Some crustiness on eyelashes  Purulent mucous   +4 injection conjunctiva  Hazy cornea  Formed AC    DFE:   OD: hazy view, red reflex with optic nerve  OS: sharp optic nerve edges, c/d 0.3, good foveal reflex, no intraocular pathology        A/P:     1. Orbital cellulitis , right eye   -improved as per pt and per CT results in the last 24 hours   -continue current IV Abx  -warm compress  BID and Tobramycin eyedrops QID OD to   -decreased in vision is likely due to corneal irritation--> will re-evalute when swelling is gone  -pt needs to follow up with ophtho outpt

## 2017-09-28 PROBLEM — N39.0 URINARY TRACT INFECTION WITHOUT HEMATURIA: Status: ACTIVE | Noted: 2017-09-28

## 2017-09-28 LAB
ALBUMIN SERPL BCP-MCNC: 3.9 G/DL (ref 3.2–4.9)
BASOPHILS # BLD AUTO: 0.4 % (ref 0–1.8)
BASOPHILS # BLD: 0.03 K/UL (ref 0–0.12)
BUN SERPL-MCNC: 5 MG/DL (ref 8–22)
C TRACH DNA SPEC QL NAA+PROBE: NEGATIVE
CALCIUM SERPL-MCNC: 9.5 MG/DL (ref 8.5–10.5)
CHLORIDE SERPL-SCNC: 105 MMOL/L (ref 96–112)
CO2 SERPL-SCNC: 19 MMOL/L (ref 20–33)
CREAT SERPL-MCNC: 0.49 MG/DL (ref 0.5–1.4)
EOSINOPHIL # BLD AUTO: 0.04 K/UL (ref 0–0.51)
EOSINOPHIL NFR BLD: 0.5 % (ref 0–6.9)
ERYTHROCYTE [DISTWIDTH] IN BLOOD BY AUTOMATED COUNT: 43.2 FL (ref 35.9–50)
GFR SERPL CREATININE-BSD FRML MDRD: >60 ML/MIN/1.73 M 2
GLUCOSE SERPL-MCNC: 81 MG/DL (ref 65–99)
HAV IGM SERPL QL IA: NEGATIVE
HBV CORE IGM SER QL: NEGATIVE
HBV SURFACE AG SER QL: NEGATIVE
HCT VFR BLD AUTO: 43 % (ref 37–47)
HCV AB SER QL: NEGATIVE
HGB BLD-MCNC: 14.2 G/DL (ref 12–16)
IMM GRANULOCYTES # BLD AUTO: 0.05 K/UL (ref 0–0.11)
IMM GRANULOCYTES NFR BLD AUTO: 0.6 % (ref 0–0.9)
LYMPHOCYTES # BLD AUTO: 2.25 K/UL (ref 1–4.8)
LYMPHOCYTES NFR BLD: 26.8 % (ref 22–41)
MAGNESIUM SERPL-MCNC: 1.8 MG/DL (ref 1.5–2.5)
MCH RBC QN AUTO: 28.3 PG (ref 27–33)
MCHC RBC AUTO-ENTMCNC: 33 G/DL (ref 33.6–35)
MCV RBC AUTO: 85.8 FL (ref 81.4–97.8)
MONOCYTES # BLD AUTO: 0.32 K/UL (ref 0–0.85)
MONOCYTES NFR BLD AUTO: 3.8 % (ref 0–13.4)
N GONORRHOEA DNA SPEC QL NAA+PROBE: POSITIVE
NEUTROPHILS # BLD AUTO: 5.7 K/UL (ref 2–7.15)
NEUTROPHILS NFR BLD: 67.9 % (ref 44–72)
NRBC # BLD AUTO: 0 K/UL
NRBC BLD AUTO-RTO: 0 /100 WBC
PHOSPHATE SERPL-MCNC: 2.8 MG/DL (ref 2.5–4.5)
PLATELET # BLD AUTO: 287 K/UL (ref 164–446)
PMV BLD AUTO: 12.4 FL (ref 9–12.9)
POTASSIUM SERPL-SCNC: 3.3 MMOL/L (ref 3.6–5.5)
RBC # BLD AUTO: 5.01 M/UL (ref 4.2–5.4)
SODIUM SERPL-SCNC: 137 MMOL/L (ref 135–145)
SPEC CONTAINER SPEC: ABNORMAL
SPECIMEN SOURCE: ABNORMAL
WBC # BLD AUTO: 8.4 K/UL (ref 4.8–10.8)

## 2017-09-28 PROCEDURE — 80074 ACUTE HEPATITIS PANEL: CPT

## 2017-09-28 PROCEDURE — 83735 ASSAY OF MAGNESIUM: CPT

## 2017-09-28 PROCEDURE — A9270 NON-COVERED ITEM OR SERVICE: HCPCS | Performed by: ANESTHESIOLOGY

## 2017-09-28 PROCEDURE — A9270 NON-COVERED ITEM OR SERVICE: HCPCS

## 2017-09-28 PROCEDURE — 700101 HCHG RX REV CODE 250: Performed by: ANESTHESIOLOGY

## 2017-09-28 PROCEDURE — 700102 HCHG RX REV CODE 250 W/ 637 OVERRIDE(OP)

## 2017-09-28 PROCEDURE — 80069 RENAL FUNCTION PANEL: CPT

## 2017-09-28 PROCEDURE — 770006 HCHG ROOM/CARE - MED/SURG/GYN SEMI*

## 2017-09-28 PROCEDURE — A9270 NON-COVERED ITEM OR SERVICE: HCPCS | Performed by: STUDENT IN AN ORGANIZED HEALTH CARE EDUCATION/TRAINING PROGRAM

## 2017-09-28 PROCEDURE — 85025 COMPLETE CBC W/AUTO DIFF WBC: CPT

## 2017-09-28 PROCEDURE — 700102 HCHG RX REV CODE 250 W/ 637 OVERRIDE(OP): Performed by: HOSPITALIST

## 2017-09-28 PROCEDURE — 99232 SBSQ HOSP IP/OBS MODERATE 35: CPT | Mod: GC | Performed by: INTERNAL MEDICINE

## 2017-09-28 PROCEDURE — 700111 HCHG RX REV CODE 636 W/ 250 OVERRIDE (IP): Performed by: ANESTHESIOLOGY

## 2017-09-28 PROCEDURE — 700102 HCHG RX REV CODE 250 W/ 637 OVERRIDE(OP): Performed by: STUDENT IN AN ORGANIZED HEALTH CARE EDUCATION/TRAINING PROGRAM

## 2017-09-28 PROCEDURE — 36415 COLL VENOUS BLD VENIPUNCTURE: CPT

## 2017-09-28 PROCEDURE — 700102 HCHG RX REV CODE 250 W/ 637 OVERRIDE(OP): Performed by: ANESTHESIOLOGY

## 2017-09-28 PROCEDURE — A9270 NON-COVERED ITEM OR SERVICE: HCPCS | Performed by: HOSPITALIST

## 2017-09-28 RX ORDER — POTASSIUM CHLORIDE 20 MEQ/1
20 TABLET, EXTENDED RELEASE ORAL ONCE
Status: COMPLETED | OUTPATIENT
Start: 2017-09-28 | End: 2017-09-28

## 2017-09-28 RX ORDER — METRONIDAZOLE 500 MG/1
500 TABLET ORAL EVERY 8 HOURS
Status: DISCONTINUED | OUTPATIENT
Start: 2017-09-28 | End: 2017-09-29 | Stop reason: HOSPADM

## 2017-09-28 RX ORDER — MAGNESIUM SULFATE HEPTAHYDRATE 40 MG/ML
2 INJECTION, SOLUTION INTRAVENOUS ONCE
Status: DISPENSED | OUTPATIENT
Start: 2017-09-28 | End: 2017-09-29

## 2017-09-28 RX ADMIN — METRONIDAZOLE 500 MG: 500 TABLET ORAL at 20:28

## 2017-09-28 RX ADMIN — METRONIDAZOLE 500 MG: 500 INJECTION, SOLUTION INTRAVENOUS at 05:38

## 2017-09-28 RX ADMIN — POTASSIUM CHLORIDE 20 MEQ: 1500 TABLET, EXTENDED RELEASE ORAL at 07:15

## 2017-09-28 RX ADMIN — TOBRAMYCIN: 3 OINTMENT OPHTHALMIC at 09:20

## 2017-09-28 RX ADMIN — NICOTINE 21 MG: 21 PATCH, EXTENDED RELEASE TRANSDERMAL at 05:38

## 2017-09-28 RX ADMIN — METRONIDAZOLE 500 MG: 500 TABLET ORAL at 17:00

## 2017-09-28 RX ADMIN — CEFTRIAXONE 1 G: 1 INJECTION, SOLUTION INTRAVENOUS at 11:29

## 2017-09-28 RX ADMIN — DIBASIC SODIUM PHOSPHATE, MONOBASIC POTASSIUM PHOSPHATE AND MONOBASIC SODIUM PHOSPHATE 1 TABLET: 852; 155; 130 TABLET ORAL at 09:18

## 2017-09-28 RX ADMIN — OXYCODONE HYDROCHLORIDE 5 MG: 5 TABLET ORAL at 00:03

## 2017-09-28 RX ADMIN — DIBASIC SODIUM PHOSPHATE, MONOBASIC POTASSIUM PHOSPHATE AND MONOBASIC SODIUM PHOSPHATE 1 TABLET: 852; 155; 130 TABLET ORAL at 20:28

## 2017-09-28 ASSESSMENT — ENCOUNTER SYMPTOMS
VOMITING: 0
MYALGIAS: 0
DIARRHEA: 0
SHORTNESS OF BREATH: 0
ABDOMINAL PAIN: 0
SPEECH CHANGE: 0
NAUSEA: 0
BLURRED VISION: 1
EYE PAIN: 1
EYE DISCHARGE: 0
CONSTIPATION: 0
PALPITATIONS: 0
CHILLS: 0
FEVER: 0
COUGH: 0
SENSORY CHANGE: 0

## 2017-09-28 ASSESSMENT — PAIN SCALES - GENERAL
PAINLEVEL_OUTOF10: 0
PAINLEVEL_OUTOF10: 0
PAINLEVEL_OUTOF10: 2
PAINLEVEL_OUTOF10: 5
PAINLEVEL_OUTOF10: 3
PAINLEVEL_OUTOF10: 7

## 2017-09-28 ASSESSMENT — LIFESTYLE VARIABLES: DO YOU DRINK ALCOHOL: YES

## 2017-09-28 NOTE — PROGRESS NOTES
Report received from Night RN. Assumed Care at 0700. Patient is AAOx4. Assessment Performed. Patient states having pain 7 out of 10 located BL eyes. Patient Medicated, Refer to Mar. Patient needs have been met at this time. Patient resting in bed at this time. Patient instructed to call when needing assistance. Bed in the lowest and locked position. Hourly rounding in place. Call light within reach.

## 2017-09-28 NOTE — PROGRESS NOTES
Hallie Aranda Fall Risk Assessment:     Last Known Fall: No falls  Mobility: No limitations  Medications: Cardiovascular or central nervous system meds  Mental Status/LOC/Awareness: Awake, alert, and oriented to date, place, and person  Toileting Needs: No needs  Volume/Electrolyte Status: No problems  Communication/Sensory: Blindness or recent visual change  Behavior: Appropriate behavior  Hallie Aranda Fall Risk Total: 6  Fall Risk Level: NO RISK    Universal Fall Precautions:  call light/belongings in reach, bed in low position and locked, wheelchairs and assistive devices out of sight, siderails up x 2, use non-slip footwear, adequate lighting, clutter free and spill free environment, educate on level of risk, educate to call for assistance    Fall Risk Level Interventions:          Patient Specific Interventions:     Medication: review medications with patient and family, assess for medications that can be discontinued or dosage decreased and limit combination of prn medications  Mental Status/LOC/Awareness: reinforce falls education and reinforce the use of call light  Toileting: provide frquent toileting and instruct patient/family on the need to call for assistance when toileting  Volume/Electrolyte Status: ensure patient remains hydrated, monitor abnormal lab values and ensure IV fluids are appropriate  Communication/Sensory: update plan of care on whiteboard and ensure patient has glasses/contacts and hearing aids/dentures  Behavioral: encourage patient to voice feelings, administer medication as ordered, instruct/reinforce fall program rationale and use appropriate de-escalation techniques  Mobility: ensure bed is locked and in lowest position and provide appropriate assistive device

## 2017-09-28 NOTE — CARE PLAN
Problem: Safety  Goal: Will remain free from falls    Intervention: Assess risk factors for falls   09/27/17 0900 09/27/17 1300   OTHER   Fall Risk Risk to Fall - 0 - 1 point --    Risk for Injury-Any positive answers results in the pt being at high risk for fall related injury Not Applicable --    Mobility Status Assessment 0-Ambulates & Transfers Independently. No Assistance Required --    History of fall 0 --    Pt Calls for Assistance --  Yes     Patient environment free of any cluster. Patient encouraged to call when needing assistance with ambulation.         Problem: Pain Management  Goal: Pain level will decrease to patient's comfort goal    Intervention: Follow pain managment plan developed in collaboration with patient and Interdisciplinary Team  Patient provided with pharmacological intervention refer to MAR, to manage patient pain.

## 2017-09-28 NOTE — PROGRESS NOTES
Hallie Aranda Fall Risk Assessment:     Last Known Fall: No falls  Mobility: No limitations  Medications: Cardiovascular or central nervous system meds  Mental Status/LOC/Awareness: Awake, alert, and oriented to date, place, and person  Toileting Needs: No needs  Volume/Electrolyte Status: No problems  Communication/Sensory: Blindness or recent visual change  Behavior: Appropriate behavior  Hallie Aranda Fall Risk Total: 6  Fall Risk Level: NO RISK    Universal Fall Precautions:  call light/belongings in reach, bed in low position and locked, siderails up x 2, wheelchairs and assistive devices out of sight, use non-slip footwear, adequate lighting, clutter free and spill free environment, educate on level of risk, educate to call for assistance    Fall Risk Level Interventions:          Patient Specific Interventions:     Medication: review medications with patient and family, assess for medications that can be discontinued or dosage decreased and limit combination of prn medications  Mental Status/LOC/Awareness: reinforce falls education, check on patient hourly and reinforce the use of call light  Toileting: instruct patient/family on the use of grab bars and instruct patient/family on the need to call for assistance when toileting  Volume/Electrolyte Status: ensure patient remains hydrated, monitor abnormal lab values and ensure IV fluids are appropriate  Communication/Sensory: update plan of care on whiteboard  Behavioral: administer medication as ordered  Mobility: ensure bed is locked and in lowest position

## 2017-09-28 NOTE — CARE PLAN
Problem: Communication  Goal: The ability to communicate needs accurately and effectively will improve  Outcome: PROGRESSING AS EXPECTED  Pt capable of verbalizing all needs and utilizes call light system approprietly.    Problem: Venous Thromboembolism (VTW)/Deep Vein Thrombosis (DVT) Prevention:  Goal: Patient will participate in Venous Thrombosis (VTE)/Deep Vein Thrombosis (DVT)Prevention Measures  Outcome: PROGRESSING AS EXPECTED  Pt on Lovenox for pharmacologic prophylaxis.

## 2017-09-28 NOTE — PROGRESS NOTES
Patient Right eye cleansed with normal saline flushes and gauze. Tobramycin ophthalmic ointment applied to right eye.

## 2017-09-28 NOTE — PROGRESS NOTES
Assumed care of pt, received report from day shift RN, pt assessed.  Pt complaining of 6/10 right and left eye pain, no pain medication available at this time, will medicate when possible.  Pt is A&O x4.  Pt is not a fall risk, wearing treaded socks, bed locked and in lowest position, bed alarm not indicated.  Pt instructed to call for assistance as needed, pt verbalized understanding.  Call light, phone, and personal belongings within reach.

## 2017-09-28 NOTE — PROGRESS NOTES
Laila in microbiology called to clarify pt swab from R EYE came back GC positive.   Will make Dr. Bryson aware

## 2017-09-28 NOTE — PROGRESS NOTES
Internal Medicine Interval Note    Name Liv Mendez       1990   Age/Sex 27 y.o. female   MRN [id number]   Code Status FULL     After 5PM or if no immediate response to page, please call for cross-coverage  Attending/Team: Dr. Andres/heidi See Patient List for primary contact information  Call [phone] to page    1st Call - Day Intern (R1):   Emil 2nd Call - Day Sr. Resident (R2/R3):   Renetta          Reason for interval visit  (Principal Problem)   Cellulitis of periorbital region of both eyes    Interval Problem Daily Status Update  (24 hours)   Patient's cellulitis has improved significantly this morning. Patient is still having some pain but it is improved from yesterday. She is now able to open her left eye easily and no longer has purulent discharge from either eye. Patient positive for GC. Patient denies vaginal discharge, pain, odor, or dyspareunia.      Review of Systems   Constitutional: Negative for chills and fever.   Eyes: Positive for blurred vision and pain. Negative for discharge.   Respiratory: Negative for cough and shortness of breath.    Cardiovascular: Negative for chest pain and palpitations.   Gastrointestinal: Negative for abdominal pain, constipation, diarrhea, nausea and vomiting.   Genitourinary: Negative for dysuria, frequency and urgency.   Musculoskeletal: Negative for joint pain and myalgias.   Skin: Negative for itching and rash.   Neurological: Negative for sensory change and speech change.       Consultants/Specialty  Ophthamology    Disposition  Inpatient for periorbital cellulitis    Quality Measures    Reviewed items::  Labs reviewed, Medications reviewed and Radiology images reviewed  Hayes catheter::  No Hayes  DVT prophylaxis pharmacological::  Enoxaparin (Lovenox)  DVT prophylaxis - mechanical:  SCDs  Ulcer Prophylaxis::  No  Antibiotics:  Treating active infection/contamination beyond 24 hours perioperative coverage          Physical Exam        Vitals:    09/26/17 1921 09/27/17 0400 09/27/17 0755 09/27/17 1552   BP: 120/79 103/61 133/85 115/79   Pulse: 73 79 89 73   Resp: 18 16 16 16   Temp: 36.8 °C (98.3 °F) 37 °C (98.6 °F) 36.4 °C (97.5 °F) 36.9 °C (98.4 °F)   SpO2: 100% 96% 95% 90%   Weight:         Body mass index is 21.41 kg/m².    Oxygen Therapy:  Pulse Oximetry: 90 %, O2 (LPM): 3, O2 Delivery: Silicone Nasal Cannula    Physical Exam   Constitutional: She is oriented to person, place, and time. Vital signs are normal. No distress.   Laying in bed.    HENT:   Head: Normocephalic and atraumatic.   Eyes: Conjunctivae are normal. Pupils are equal, round, and reactive to light. Right eye exhibits no discharge. Left eye exhibits no discharge.   Periorbital cellulitis worse on right. Erythema of right eyelid- interval improvement since yesterday.    Patient can open Left eye without difficulty. Patient able see with left eye   Cardiovascular: Normal rate and regular rhythm.    Pulmonary/Chest: Effort normal and breath sounds normal.   Abdominal: Soft. She exhibits no distension. There is no tenderness.   Musculoskeletal: Normal range of motion. She exhibits no edema or tenderness.   Neurological: She is oriented to person, place, and time.   Nursing note and vitals reviewed.        Lab Data Review:         9/26/2017  1:36 PM    Recent Labs      09/25/17 2034 09/26/17 0601 09/27/17   0141   SODIUM  133*  137  138   POTASSIUM  3.1*  3.3*  3.9   CHLORIDE  100  102  109   CO2  24  26  23   BUN  6*  6*  8   CREATININE  0.60  0.62  0.59   MAGNESIUM   --   1.8   --    PHOSPHORUS   --    --   2.4*   CALCIUM  9.2  8.6  8.4*       Recent Labs      09/25/17 2034 09/26/17   0601  09/27/17   0141   ALTSGPT   --   24   --    ASTSGOT   --   28   --    ALKPHOSPHAT   --   135*   --    TBILIRUBIN   --   0.9   --    GLUCOSE  147*  106*  105*       Recent Labs      09/25/17 2034 09/26/17   0601 09/26/17   0802  09/27/17   0141   RBC  4.86  4.64   --   4.20    HEMOGLOBIN  13.3  13.1   --   12.1   HEMATOCRIT  40.7  39.2   --   36.4*   PLATELETCT  286  274   --   247   PROTHROMBTM   --    --   14.6   --    INR   --    --   1.10   --        Recent Labs      09/25/17 2034  09/26/17   0601  09/27/17   0141   WBC  11.7*  9.6  7.4   NEUTSPOLYS  83.80*  72.20*  65.50   LYMPHOCYTES  10.80*  19.60*  25.20   MONOCYTES  4.60  6.20  6.70   EOSINOPHILS  0.20  1.30  1.90   BASOPHILS  0.30  0.30  0.30   ASTSGOT   --   28   --    ALTSGPT   --   24   --    ALKPHOSPHAT   --   135*   --    TBILIRUBIN   --   0.9   --            Assessment/Plan     * Cellulitis of periorbital region of both eyes- (present on admission)   Assessment & Plan    - Improved significantly this morning. No purulent drainage noted on exam.    - Cx negative to date  - Chlamydia negative /GC positive  - HIV and RPR nonreactive  - supportive care with warm compress   - Ophthalmology consulted- patient is improving, continue to watch for now. Tobramycin drops.   - If patient worsens repeat CT and consult ENT.   - On C3, Azithro, flagyl  - Will update ophthalmologist on pt progress on Thursday  - Pt to F/U with ophthalmology as outpatient as well        Hypokalemia- (present on admission)   Assessment & Plan    replete as needed         Tobacco use- (present on admission)   Assessment & Plan    - Nicotine patches ordered  - educate patient on tobacco cessation        Heroin abuse- (present on admission)   Assessment & Plan    - hx of Heroin use, denies IV use  - last use 4 days ago   - pt on oxycodone 5mg and morphine IV  -  on cessation

## 2017-09-28 NOTE — ASSESSMENT & PLAN NOTE
- denies dysuria, foul odor, and urgency. Possible increased frequency over the last several days.   - UA- (+) leukocyte esterase. (+)Trichomonas   -  patient on importance of treatment of partner. Pt states that both her and her partner have had trichomonas in the past and that they both have received treatment. She will inform partner of need for testing/treatment again.   - Cont flagyl

## 2017-09-28 NOTE — PROGRESS NOTES
Hlalie Aranda Fall Risk Assessment:     Last Known Fall: No falls  Mobility: No limitations  Medications: No meds  Mental Status/LOC/Awareness: Awake, alert, and oriented to date, place, and person  Toileting Needs: No needs  Volume/Electrolyte Status: No problems  Communication/Sensory: Blindness or recent visual change  Behavior: Appropriate behavior  Hallie Aranda Fall Risk Total: 5  Fall Risk Level: NO RISK    Universal Fall Precautions:  call light/belongings in reach, bed in low position and locked, siderails up x 2, use non-slip footwear, adequate lighting, clutter free and spill free environment, educate to call for assistance    Fall Risk Level Interventions:          Patient Specific Interventions:     Medication: review medications with patient and family  Mental Status/LOC/Awareness: check on patient hourly  Toileting: monitor intake and output/use of appropriate interventions and instruct patient/family on the need to call for assistance when toileting  Volume/Electrolyte Status: ensure patient remains hydrated and monitor abnormal lab values  Communication/Sensory: update plan of care on whiteboard and ensure proper positioning when transferrng/ambulating  Behavioral: encourage patient to voice feelings and administer medication as ordered  Mobility: provide comfort measures during transport, ensure bed is locked and in lowest position and instruct patient to exit bed on their strongest side

## 2017-09-29 VITALS
OXYGEN SATURATION: 99 % | TEMPERATURE: 98.8 F | DIASTOLIC BLOOD PRESSURE: 107 MMHG | SYSTOLIC BLOOD PRESSURE: 145 MMHG | WEIGHT: 136.69 LBS | BODY MASS INDEX: 21.41 KG/M2 | HEART RATE: 96 BPM | RESPIRATION RATE: 18 BRPM

## 2017-09-29 LAB
AMPHET UR QL SCN: POSITIVE
BARBITURATES UR QL SCN: NEGATIVE
BENZODIAZ UR QL SCN: NEGATIVE
BZE UR QL SCN: NEGATIVE
CANNABINOIDS UR QL SCN: NEGATIVE
METHADONE UR QL SCN: NEGATIVE
OPIATES UR QL SCN: POSITIVE
OXYCODONE UR QL SCN: POSITIVE
PCP UR QL SCN: NEGATIVE
PROPOXYPH UR QL SCN: NEGATIVE

## 2017-09-29 PROCEDURE — 700111 HCHG RX REV CODE 636 W/ 250 OVERRIDE (IP): Performed by: ANESTHESIOLOGY

## 2017-09-29 PROCEDURE — A9270 NON-COVERED ITEM OR SERVICE: HCPCS | Performed by: STUDENT IN AN ORGANIZED HEALTH CARE EDUCATION/TRAINING PROGRAM

## 2017-09-29 PROCEDURE — 700102 HCHG RX REV CODE 250 W/ 637 OVERRIDE(OP): Performed by: STUDENT IN AN ORGANIZED HEALTH CARE EDUCATION/TRAINING PROGRAM

## 2017-09-29 PROCEDURE — A9270 NON-COVERED ITEM OR SERVICE: HCPCS

## 2017-09-29 PROCEDURE — 99239 HOSP IP/OBS DSCHRG MGMT >30: CPT | Mod: GC | Performed by: INTERNAL MEDICINE

## 2017-09-29 PROCEDURE — 700105 HCHG RX REV CODE 258: Performed by: STUDENT IN AN ORGANIZED HEALTH CARE EDUCATION/TRAINING PROGRAM

## 2017-09-29 PROCEDURE — 80307 DRUG TEST PRSMV CHEM ANLYZR: CPT

## 2017-09-29 PROCEDURE — A9270 NON-COVERED ITEM OR SERVICE: HCPCS | Performed by: ANESTHESIOLOGY

## 2017-09-29 PROCEDURE — 700102 HCHG RX REV CODE 250 W/ 637 OVERRIDE(OP)

## 2017-09-29 PROCEDURE — 700102 HCHG RX REV CODE 250 W/ 637 OVERRIDE(OP): Performed by: ANESTHESIOLOGY

## 2017-09-29 RX ORDER — METRONIDAZOLE 500 MG/1
500 TABLET ORAL 2 TIMES DAILY
Qty: 12 TAB | Refills: 0 | Status: SHIPPED | OUTPATIENT
Start: 2017-09-29 | End: 2017-10-05

## 2017-09-29 RX ADMIN — NICOTINE 21 MG: 21 PATCH, EXTENDED RELEASE TRANSDERMAL at 05:27

## 2017-09-29 RX ADMIN — METRONIDAZOLE 500 MG: 500 TABLET ORAL at 05:27

## 2017-09-29 RX ADMIN — CEFTRIAXONE 1 G: 1 INJECTION, SOLUTION INTRAVENOUS at 12:00

## 2017-09-29 RX ADMIN — SODIUM CHLORIDE: 9 INJECTION, SOLUTION INTRAVENOUS at 05:28

## 2017-09-29 RX ADMIN — OXYCODONE HYDROCHLORIDE 5 MG: 5 TABLET ORAL at 00:36

## 2017-09-29 RX ADMIN — DIBASIC SODIUM PHOSPHATE, MONOBASIC POTASSIUM PHOSPHATE AND MONOBASIC SODIUM PHOSPHATE 1 TABLET: 852; 155; 130 TABLET ORAL at 09:31

## 2017-09-29 RX ADMIN — METRONIDAZOLE 500 MG: 500 TABLET ORAL at 14:37

## 2017-09-29 ASSESSMENT — PAIN SCALES - GENERAL
PAINLEVEL_OUTOF10: 2
PAINLEVEL_OUTOF10: 1

## 2017-09-29 ASSESSMENT — LIFESTYLE VARIABLES: DO YOU DRINK ALCOHOL: YES

## 2017-09-29 NOTE — DISCHARGE SUMMARY
Internal Medicine Discharge Summary      Admit Date:  9/25/2017       Discharge Date:   9/29/17    Service:   [facility] Internal Medicine White Team  Attending Physician(s):   Dr Andrse        Senior Resident(s):   Dr Jackson   Dallin Resident(s):   Dr Stein       Primary Diagnosis:    Cellulitis of periorbital region of both eyes    Gonorrhea eye infection    Gonorrhea positive in urine specimen without symptoms      Secondary Diagnoses:                  Heroin abuse     Methamphetamine abuse      Tobacco use     Hypokalemia     Urinary tract infection     Hospital Summary (Brief Narrative):       Please refer to admission history and physical for details:   Liv Mendez is a 28 yo F with past medical history of anxiety, depression, and substance abuse (heroin), methamphetamine was admitted for bilateral periorbital cellulitis. She was previously seen at [facility] for the same problem and was provided eye drops that she was unable to use due to eye swelling. At admit, the patient was unable to open her Right eye and could minimally open her Left eye.    She was started on unasyn, vancomycin, and tobramycin drops. Head CT revealed Right periorbital soft tissue swelling with possible fluid collection within eyelid. Repeat CT the next day showed improvement in soft tissue swelling, decreased fluid collection, and no post-septal involvement.     Ophthalmology was consulted, who felt that patient was improving and agreed with antibiotic coverage. Gram stain of her Right eye secretion revealed rare gram negative diplococci and rare gram positive rods. Gonorrhea returned positive for eye discharge and urine sample. Blood culture showed no growth and the patient was HIV and RPR negative. urinalysis  positive for trichamonas. Her antibiotic treatment was switched to ceftriaxone, flagyl, and azithromycin due to gram stain results and positive gonorrhea. The patient's eye swelling improved and discharge cleared completely.  The vision in her left eye returned back to baseline and the vision in her right eye is improving. She was able to identify shapes and numbers with her right eye but unable to read clearly.     Ophthalmologist was updated with improvements. Felt that patient was stable for discharge with close follow up with ophthalmology. The patient expressed a strong desire to be discharged and expressed interest in following up with ophthalmology outpatient. She was discharged home in stable condition with flagyl and tobramycin drops and follow-up appointment with ophthalmology on Monday. Patient was counseled on the importance of having her significant other treated for gonorrhea and trichomonas. She verbalized understanding.       Patient /Hospital Summary (Details -- Problem Oriented) :          * Cellulitis of periorbital region of both eyes   Assessment & Plan    - no purulent discharge today   - Cx negative to date  - gram stain showed gram (-) diplococci and gram (+) rods. Culture not ordered with original gram stain. Culture ordered.   - Chlamydia negative /Gonorrhea positive  - HIV and RPR nonreactive  - Ophthalmology consulted. Appreciate recs. Tobramycin drops.  - Opthalmology updated and happy with progress and okay for discharge as patient's swelling and pain has improved and her vision is beginning to improve as well.   - Pt to F/U with [facility] as outpatient early next week.   - discharged on flagyl (discussed with [facility] pharmacy)         Urinary tract infection    Assessment & Plan    - denies dysuria, foul odor, and urgency. Possible increased frequency over the last several days.   - UA- (+) leukocyte esterase. (+)Trichomonas   -  patient on importance of treatment of partner. Pt states that both her and her partner have had trichomonas in the past and that they both have received treatment. She will inform partner of need for testing/treatment again.   - Cont flagyl         Tobacco use    Assessment & Plan    - Nicotine patches ordered  - educate patient on tobacco cessation        Heroin abuse   Assessment & Plan    - hx of Heroin use, denies IV use  - last use 4 days ago   - pt on oxycodone 5mg and morphine IV  -  on cessation            Consultants:     Ophthalmology     Procedures:        None     Imaging/ Testing:      [id number] W/O   Final Result      1.  Overall, edema and inflammatory change in the preseptal soft tissues appears decreased. There is no evidence of post septal extension.   2.   Previously demonstrated small fluid collection anterior to the right orbit appears somewhat smaller and now contains a small focus of gas, possibly related to instrumentation versus gas producing organism.      [id number] W/O   Final Result      1.  RIGHT periorbital soft tissue swelling with possible fluid collection within the eyelid, concerning for abscess.   2.  No intraorbital/postseptal hemorrhage or inflammatory process.   3.  RIGHT forehead traversing noted.   4.  No orbital fracture.            Discharge Medications:         Medication List      START taking these medications      Instructions   metronidazole 500 MG Tabs  Commonly known as:  FLAGYL   Take 1 Tab by mouth 2 Times a Day for 6 days.  Dose:  500 mg     tobramycin 0.3 % Oint ophthalmic ointment  Commonly known as:  TOBREX   Place 1 Application in right eye 4 times a day. (Place small amount on inner lower lid of right eye)  Dose:  1 Application            Disposition:   Home     Diet:   Regular diet     Activity:   As tolerated     Instructions:       The patient was instructed to return to the ER in the event of worsening symptoms. I have counseled the patient on the importance of compliance and the patient has agreed to proceed with all medical recommendations and follow up plan indicated above.   The patient understands that all medications come with benefits and risks. Risks may include permanent injury or  death and these risks can be minimized with close reassessment and monitoring.        Primary Care Provider:    Trevon Cobian M.D.  Discharge summary faxed to primary care provider:  Completed  Copy of discharge summary given to the patient: Deferred      Follow up appointment details :      Ophthalmology   Primary care physician     Pending Studies:        Culture results with sensitivities     Time spent on discharge day patient visit, preparing discharge paperwork and arranging for patient follow up.    Summary of follow up issues:   Resolution of periorbital cellulitis     Discharge Time (Minutes) :    60 minutes     Condition on Discharge    ______________________________________________________________________    Interval history/exam for day of discharge:    Patient's right eye swelling improved, vision improving, she expressed that she wanted to discharged today, wanted to follow outpatient. Extensive counseling provided.     Vitals:    09/28/17 1610 09/28/17 1955 09/29/17 0445 09/29/17 0748   BP: 138/77 143/104 140/102 145/107   Pulse: 77 90 (!) 108 96   Resp: 17 18 17 18   Temp: 36.7 °C (98 °F) 36.8 °C (98.2 °F) 36.6 °C (97.9 °F) 37.1 °C (98.8 °F)   SpO2: 95% 98% 99% 99%   Weight:         Weight/BMI: Body mass index is 21.41 kg/m².  Pulse Oximetry: 99 %, O2 (LPM): 0, O2 Delivery: None (Room Air)    Physical Exam   Constitutional: She is oriented to person, place, and time. Vital signs are normal. No distress.   HENT:   Head: Normocephalic and atraumatic.   Eyes: Conjunctivae are normal. Pupils are equal, round, and reactive to light. Right eye exhibits no discharge. Left eye exhibits no discharge.   Right eye- mild conjunctiva injection. Hazy cornea.  Periorbital cellulitis decreased a lot from yesterday.    Left eye- no cellulitis or discharge.   No discharge bilaterally.  Pupils 4-6 mm and equal. Convergence intact.   No ophthalmoplegia   Cardiovascular: Normal rate and regular rhythm.     Pulmonary/Chest: Effort normal and breath sounds normal.   Abdominal: Soft. She exhibits no distension. There is no tenderness.   Musculoskeletal: Normal range of motion. She exhibits no edema or tenderness.   Neurological: She is oriented to person, place, and time.    Most Recent Labs:    Lab Results   Component Value Date/Time    WBC 8.4 09/28/2017 02:33 AM    RBC 5.01 09/28/2017 02:33 AM    HEMOGLOBIN 14.2 09/28/2017 02:33 AM    HEMATOCRIT 43.0 09/28/2017 02:33 AM    MCV 85.8 09/28/2017 02:33 AM    MCH 28.3 09/28/2017 02:33 AM    MCHC 33.0 (L) 09/28/2017 02:33 AM    MPV 12.4 09/28/2017 02:33 AM    NEUTSPOLYS 67.90 09/28/2017 02:33 AM    LYMPHOCYTES 26.80 09/28/2017 02:33 AM    MONOCYTES 3.80 09/28/2017 02:33 AM    EOSINOPHILS 0.50 09/28/2017 02:33 AM    BASOPHILS 0.40 09/28/2017 02:33 AM    HYPOCHROMIA 1+ 02/22/2013 03:01 PM      Lab Results   Component Value Date/Time    SODIUM 137 09/28/2017 02:33 AM    POTASSIUM 3.3 (L) 09/28/2017 02:33 AM    CHLORIDE 105 09/28/2017 02:33 AM    CO2 19 (L) 09/28/2017 02:33 AM    GLUCOSE 81 09/28/2017 02:33 AM    BUN 5 (L) 09/28/2017 02:33 AM    CREATININE 0.49 (L) 09/28/2017 02:33 AM    CREATININE 0.9 06/05/2008 12:18 AM      Lab Results   Component Value Date/Time    ALTSGPT 24 09/26/2017 06:01 AM    ASTSGOT 28 09/26/2017 06:01 AM    ALKPHOSPHAT 135 (H) 09/26/2017 06:01 AM    TBILIRUBIN 0.9 09/26/2017 06:01 AM    LIPASE 31 06/05/2008 12:18 AM    ALBUMIN 3.9 09/28/2017 02:33 AM    GLOBULIN 3.4 09/26/2017 06:01 AM    INR 1.10 09/26/2017 08:02 AM     Lab Results   Component Value Date/Time    PROTHROMBTM 14.6 09/26/2017 08:02 AM    INR 1.10 09/26/2017 08:02 AM

## 2017-09-29 NOTE — PROGRESS NOTES
Pt has been given discharge paperwork and prescriptions.  Pt verbalized understanding of paperwork and of additions/changes to medication regimen.  Pt PIV d/c'd, tip intact.  Pt leaving via self  to home.

## 2017-09-29 NOTE — PROGRESS NOTES
Assumed care of patient @ 0700, report received at bedside,  assessment done, labs and orders noted.  Pt A & Ox4, PIV running maintenance fluids as per MAR.  Pt is resting comfortably in bed, no signs or symptoms of distress.  Pt reports pain is a 2/10, declines intervention.  Pt has been updated on the plan of care.    Bed is in lowest position, fall risk socks in place, call light within reach. Pt verbalized all needs are met at this time.

## 2017-09-29 NOTE — PROGRESS NOTES
Internal Medicine Interval Note    Name Liv Mendez       1990   Age/Sex 27 y.o. female   MRN [id number]   Code Status FULL     After 5PM or if no immediate response to page, please call for cross-coverage  Attending/Team: Dr. Andres/heidi See Patient List for primary contact information  Call [phone] to page    1st Call - Day Intern (R1):   Emil 2nd Call - Day Sr. Resident (R2/R3):   Renetta          Reason for interval visit  (Principal Problem)   Cellulitis of periorbital region of both eyes    Interval Problem Daily Status Update  (24 hours)   Patient doing well this morning and anxious to leave soon. She is now able to open her left eye fully and is able to watch videos on her phone. Right eye also showing improvement. Eye pain is decreasing. Patient states that the vision in her right eye is still hazy but she believes it is starting to improve. Team discussed diagnosis with patient and UA (+) trichomonas. Patient continues to deny dysuria, urgency, vaginal discharge or odor. She does note somewhat increased frequency over the last several days. Patient states that her and her partner have been treated for trichomonas before. Patient informed to tell partner he needs to be tested again. She verbalized understanding.       Review of Systems   Constitutional: Negative for chills and fever.   Eyes: Positive for blurred vision and pain. Negative for discharge.   Respiratory: Negative for cough and shortness of breath.    Cardiovascular: Negative for chest pain and palpitations.   Gastrointestinal: Negative for abdominal pain, constipation, diarrhea, nausea and vomiting.   Genitourinary: Negative for dysuria, frequency and urgency.   Musculoskeletal: Negative for joint pain and myalgias.   Skin: Negative for itching and rash.   Neurological: Negative for sensory change and speech change.       Consultants/Specialty  Ophthamology    Disposition  Inpatient for periorbital cellulitis    Quality  Measures    Reviewed items::  Labs reviewed, Medications reviewed and Radiology images reviewed  Hayes catheter::  No Hayes  DVT prophylaxis pharmacological::  Enoxaparin (Lovenox)  DVT prophylaxis - mechanical:  SCDs  Ulcer Prophylaxis::  No  Antibiotics:  Treating active infection/contamination beyond 24 hours perioperative coverage          Physical Exam       Vitals:    09/28/17 0336 09/28/17 0730 09/28/17 1610 09/28/17 1955   BP: 128/94 141/93 138/77 143/104   Pulse: 95 88 77 90   Resp: 18 16 17 18   Temp: 36.8 °C (98.2 °F) 36.4 °C (97.5 °F) 36.7 °C (98 °F) 36.8 °C (98.2 °F)   SpO2: 100% 100% 95% 98%   Weight:         Body mass index is 21.41 kg/m².    Oxygen Therapy:  Pulse Oximetry: 98 %, O2 (LPM): 0, O2 Delivery: None (Room Air)    Physical Exam   Constitutional: She is oriented to person, place, and time. Vital signs are normal. No distress.   Laying in bed.    HENT:   Head: Normocephalic and atraumatic.   Eyes: Conjunctivae are normal. Pupils are equal, round, and reactive to light. Right eye exhibits no discharge. Left eye exhibits no discharge.   Right eye- conjunctiva injection. Hazy cornea.  Periorbital cellulitis decreased from yesterday.    Left eye- no cellulitis or discharge.   No discharge bilaterally.  Pupils 7-8mm and equal. Convergence intact.   No ophthalmoplegia      Cardiovascular: Normal rate and regular rhythm.    Pulmonary/Chest: Effort normal and breath sounds normal.   Abdominal: Soft. She exhibits no distension. There is no tenderness.   Musculoskeletal: Normal range of motion. She exhibits no edema or tenderness.   Neurological: She is oriented to person, place, and time.   Nursing note and vitals reviewed.        Lab Data Review:         9/26/2017  1:36 PM    Recent Labs      09/26/17   0601  09/27/17   0141  09/28/17   0233   SODIUM  137  138  137   POTASSIUM  3.3*  3.9  3.3*   CHLORIDE  102  109  105   CO2  26  23  19*   BUN  6*  8  5*   CREATININE  0.62  0.59  0.49*   MAGNESIUM   1.8   --   1.8   PHOSPHORUS   --   2.4*  2.8   CALCIUM  8.6  8.4*  9.5       Recent Labs      09/26/17   0601  09/27/17   0141 09/28/17   0233   ALTSGPT  24   --    --    ASTSGOT  28   --    --    ALKPHOSPHAT  135*   --    --    TBILIRUBIN  0.9   --    --    GLUCOSE  106*  105*  81       Recent Labs      09/26/17   0601  09/26/17   0802  09/27/17 0141 09/28/17   0233   RBC  4.64   --   4.20  5.01   HEMOGLOBIN  13.1   --   12.1  14.2   HEMATOCRIT  39.2   --   36.4*  43.0   PLATELETCT  274   --   247  287   PROTHROMBTM   --   14.6   --    --    INR   --   1.10   --    --        Recent Labs      09/26/17   0601 09/27/17 0141 09/28/17   0233   WBC  9.6  7.4  8.4   NEUTSPOLYS  72.20*  65.50  67.90   LYMPHOCYTES  19.60*  25.20  26.80   MONOCYTES  6.20  6.70  3.80   EOSINOPHILS  1.30  1.90  0.50   BASOPHILS  0.30  0.30  0.40   ASTSGOT  28   --    --    ALTSGPT  24   --    --    ALKPHOSPHAT  135*   --    --    TBILIRUBIN  0.9   --    --            Assessment/Plan     * Cellulitis of periorbital region of both eyes- (present on admission)   Assessment & Plan    - Continued improved this morning. No purulent drainage noted on exam.    - Cx negative to date  - gram stain showed gram (-) diplococci and gram (+) rods. Culture not ordered with original gram stain. Culture ordered.   - Chlamydia negative /GC positive  - HIV and RPR nonreactive  - Ophthalmology consulted. Appreciate recs. Tobramycin drops. Will update ophthalmologist today.   - If patient worsens repeat CT and consult ENT.   - cont Ceftriaxone and flagyl. 1x 1G of Azithromycin on 9/27  - Opthalmology updated and happy with progress and okay for discharge as patient's swelling and pain has improved and her vision is beginning to improve as well.   - Pt to F/U with [facility] as outpatient early next week.         Urinary tract infection    Assessment & Plan    - denies dysuria, foul odor, and urgency. Possible increased frequency over the last  several days.   - UA- (+) leukocyte esterase. (+)Trichomonas   -  patient on importance of treatment of partner. Pt states that both her and her partner have had trichomonas in the past and that they both have received treatment. She will inform partner of need for testing/treatment again.   - Cont flagyl         Hypokalemia- (present on admission)   Assessment & Plan    replete as needed         Tobacco use- (present on admission)   Assessment & Plan    - Nicotine patches ordered  - educate patient on tobacco cessation        Heroin abuse- (present on admission)   Assessment & Plan    - hx of Heroin use, denies IV use  - last use 4 days ago   - pt on oxycodone 5mg and morphine IV  -  on cessation

## 2017-09-29 NOTE — DISCHARGE INSTRUCTIONS
Discharge Instructions    Discharged to home by car with self. Discharged via walking, hospital escort: Refused.  Special equipment needed: Not Applicable    Be sure to schedule a follow-up appointment with your primary care doctor or any specialists as instructed.     Discharge Plan:   Diet Plan: Discussed  Activity Level: Discussed  Confirmed Follow up Appointment: Appointment Scheduled  Confirmed Symptoms Management: Discussed  Medication Reconciliation Updated: Yes  Influenza Vaccine Indication: Patient Refuses    I understand that a diet low in cholesterol, fat, and sodium is recommended for good health. Unless I have been given specific instructions below for another diet, I accept this instruction as my diet prescription.   Other diet: reg      Special Instructions: None    · Is patient discharged on Warfarin / Coumadin?   No     · Is patient Post Blood Transfusion?  No      Depression / Suicide Risk    As you are discharged from this [facility] facility, it is important to learn how to keep safe from harming yourself.    Recognize the warning signs:  · Abrupt changes in personality, positive or negative- including increase in energy   · Giving away possessions  · Change in eating patterns- significant weight changes-  positive or negative  · Change in sleeping patterns- unable to sleep or sleeping all the time   · Unwillingness or inability to communicate  · Depression  · Unusual sadness, discouragement and loneliness  · Talk of wanting to die  · Neglect of personal appearance   · Rebelliousness- reckless behavior  · Withdrawal from people/activities they love  · Confusion- inability to concentrate     If you or a loved one observes any of these behaviors or has concerns about self-harm, here's what you can do:  · Talk about it- your feelings and reasons for harming yourself  · Remove any means that you might use to hurt yourself (examples: pills, rope, extension cords, firearm)  · Get professional help  from the community (Mental Health, Substance Abuse, psychological counseling)  · Do not be alone:Call your Safe Contact- someone whom you trust who will be there for you.  · Call your local CRISIS HOTLINE 813-5882 or 502-752-1285  · Call your local Children's Mobile Crisis Response Team [place] (508) 393-8519 or www.[employer]eralist  · Call the toll free National Suicide Prevention Hotlines   · National Suicide Prevention Lifeline 488-916-ASVH (4624)  · National Hope Line Network 800-SUICIDE (003-6134)

## 2017-09-29 NOTE — PROGRESS NOTES
Hallie Aranda Fall Risk Assessment:     Last Known Fall: No falls  Mobility: No limitations  Medications: Cardiovascular or central nervous system meds  Mental Status/LOC/Awareness: Awake, alert, and oriented to date, place, and person  Toileting Needs: No needs  Volume/Electrolyte Status: No problems  Communication/Sensory: Blindness or recent visual change  Behavior: Appropriate behavior  Hallie Aranda Fall Risk Total: 6  Fall Risk Level: NO RISK    Universal Fall Precautions:  call light/belongings in reach, bed in low position and locked, wheelchairs and assistive devices out of sight, siderails up x 2, use non-slip footwear, adequate lighting, clutter free and spill free environment, educate on level of risk, educate to call for assistance    Fall Risk Level Interventions:          Patient Specific Interventions:     Medication: review medications with patient and family, assess for medications that can be discontinued or dosage decreased and limit combination of prn medications  Mental Status/LOC/Awareness: check on patient hourly  Toileting: not applicable  Volume/Electrolyte Status: ensure IV fluids are appropriate  Communication/Sensory: update plan of care on whiteboard  Behavioral: administer medication as ordered  Mobility: ensure bed is locked and in lowest position

## 2017-09-29 NOTE — CARE PLAN
Problem: Communication  Goal: The ability to communicate needs accurately and effectively will improve  Outcome: PROGRESSING AS EXPECTED  Pt AAOX4, oriented to call light, calls appropriately.    Problem: Infection  Goal: Will remain free from infection  Outcome: PROGRESSING AS EXPECTED  Pt taking PO flagyl, able to demonstrate how to apply eye ointment.

## 2017-09-29 NOTE — PROGRESS NOTES
Received report on pt, pt AAOX4, able to ambulate to the bathroom by herself, room air, PO antibiotics, right eye is swollen with yellowish discharge.

## 2017-09-30 LAB
BACTERIA WND AEROBE CULT: NORMAL
SIGNIFICANT IND 70042: NORMAL
SITE SITE: NORMAL
SOURCE SOURCE: NORMAL

## 2017-10-01 LAB
BACTERIA BLD CULT: NORMAL
BACTERIA BLD CULT: NORMAL
SIGNIFICANT IND 70042: NORMAL
SIGNIFICANT IND 70042: NORMAL
SITE SITE: NORMAL
SITE SITE: NORMAL
SOURCE SOURCE: NORMAL
SOURCE SOURCE: NORMAL

## 2020-06-05 ENCOUNTER — HOSPITAL ENCOUNTER (EMERGENCY)
Facility: MEDICAL CENTER | Age: 30
End: 2020-06-05
Attending: EMERGENCY MEDICINE
Payer: MEDICAID

## 2020-06-05 VITALS
BODY MASS INDEX: 21.19 KG/M2 | SYSTOLIC BLOOD PRESSURE: 131 MMHG | RESPIRATION RATE: 17 BRPM | DIASTOLIC BLOOD PRESSURE: 74 MMHG | TEMPERATURE: 98.1 F | OXYGEN SATURATION: 97 % | WEIGHT: 135 LBS | HEIGHT: 67 IN | HEART RATE: 91 BPM

## 2020-06-05 DIAGNOSIS — R45.851 SUICIDAL THOUGHTS: ICD-10-CM

## 2020-06-05 DIAGNOSIS — F11.93 OPIOID WITHDRAWAL (HCC): ICD-10-CM

## 2020-06-05 LAB — POC BREATHALIZER: 0.03 PERCENT (ref 0–0.01)

## 2020-06-05 PROCEDURE — A9270 NON-COVERED ITEM OR SERVICE: HCPCS | Performed by: EMERGENCY MEDICINE

## 2020-06-05 PROCEDURE — 302970 POC BREATHALIZER: Performed by: EMERGENCY MEDICINE

## 2020-06-05 PROCEDURE — 90791 PSYCH DIAGNOSTIC EVALUATION: CPT

## 2020-06-05 PROCEDURE — 99285 EMERGENCY DEPT VISIT HI MDM: CPT

## 2020-06-05 PROCEDURE — 700111 HCHG RX REV CODE 636 W/ 250 OVERRIDE (IP): Performed by: EMERGENCY MEDICINE

## 2020-06-05 PROCEDURE — 700102 HCHG RX REV CODE 250 W/ 637 OVERRIDE(OP): Performed by: EMERGENCY MEDICINE

## 2020-06-05 RX ORDER — ONDANSETRON 4 MG/1
4 TABLET, ORALLY DISINTEGRATING ORAL EVERY 6 HOURS PRN
Qty: 8 TAB | Refills: 0 | Status: SHIPPED | OUTPATIENT
Start: 2020-06-05

## 2020-06-05 RX ORDER — ONDANSETRON 4 MG/1
4 TABLET, ORALLY DISINTEGRATING ORAL ONCE
Status: COMPLETED | OUTPATIENT
Start: 2020-06-05 | End: 2020-06-05

## 2020-06-05 RX ORDER — CLONIDINE HYDROCHLORIDE 0.1 MG/1
0.1 TABLET ORAL ONCE
Status: COMPLETED | OUTPATIENT
Start: 2020-06-05 | End: 2020-06-05

## 2020-06-05 RX ORDER — CLONIDINE HYDROCHLORIDE 0.1 MG/1
0.1 TABLET ORAL 3 TIMES DAILY PRN
Qty: 9 TAB | Refills: 0 | Status: SHIPPED | OUTPATIENT
Start: 2020-06-05 | End: 2020-06-08

## 2020-06-05 RX ADMIN — ONDANSETRON 4 MG: 4 TABLET, ORALLY DISINTEGRATING ORAL at 00:55

## 2020-06-05 RX ADMIN — CLONIDINE HYDROCHLORIDE 0.1 MG: 0.1 TABLET ORAL at 00:55
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
## 2020-06-05 NOTE — ED NOTES
Sitter currently in place with direct view of patient. Belongings placed in patient bag and chart up for ERP for the plan of care.

## 2020-06-05 NOTE — ED TRIAGE NOTES
Chief Complaint   Patient presents with   • Drug Withdrawal     for three days from opioids with voices telling her to kill herself - The patient denies SI and wants help with the withdrawls     The patient BIB [facility] for the above complaint. The patient has been using opioids for 6-7 years with multiple failed attempts at recovery and multiple times of hearing voices like this. The patient states she was hearing voices 6-12 ago telling her to kill herself. The patient states she is not SI currently and wants help with her drug withdrawal.    VSS and she has no other complaints. The patient currently triggers low risk CSS. Sitter will be placed on patient until ERP able to assess patient.

## 2020-06-05 NOTE — ED NOTES
Discharge teaching and paperwork provided regarding narcotic withdrawal and suicidal thoughts and all questions/concerns answered. VSS, assessment stable. Given information regarding zofran and clonidine Rx. Patient discharged to the care of self and ambulated out of the ED with multiple resources for help with her withdrawal.

## 2020-06-05 NOTE — ED NOTES
"The patient states she just wants help with her drug withdrawal and states, \"If I can just get these withdrawal feelings to go away, I feel like I would be totally fine.\"  "

## 2020-06-05 NOTE — ED PROVIDER NOTES
ED Provider Note    CHIEF COMPLAINT  Chief Complaint   Patient presents with   • Drug Withdrawal     for three days from opioids with voices telling her to kill herself - The patient denies SI and wants help with the withdrawls       HPI  Liv Mendez is a 30 y.o. female who presents to the emergency department chief complaint of opiate withdrawal.  She states she last used about 3 days ago she is feeling a lot of nausea but no vomiting and she just feels like she is falling into a pit in her own mind.  She feels like her own voices telling her that if she keeps going down this that she may want hurt her self but she denies current suicidal ideation or plans to hurt herself.  She just wants help with the symptoms to make them go away.  She is requesting Suboxone as she has been giving that as an outpatient from [facility] before.  She denies any actual hallucinations suicide attempt or suicidal ideation    REVIEW OF SYSTEMS  Positives as above. Pertinent negatives include hallucination vomiting fevers chills chest pain dizziness suicidal ideation homicidal ideation  All other review of systems are negative    PAST MEDICAL HISTORY   has a past medical history of Anxiety, CONCUSSION (1/8/2010), and Depression.    SOCIAL HISTORY  Social History     Tobacco Use   • Smoking status: Current Every Day Smoker     Packs/day: 0.50     Years: 3.00     Pack years: 1.50     Types: Cigarettes   • Smokeless tobacco: Never Used   • Tobacco comment: 1/2ppd   Substance and Sexual Activity   • Alcohol use: No     Comment: soical   • Drug use: No     Comment: Going to billing school    • Sexual activity: Not on file       SURGICAL HISTORY   has a past surgical history that includes janna by laparoscopy (6/17/08).    CURRENT MEDICATIONS  Home Medications     Reviewed by Krystian Sunshine R.N. (Registered Nurse) on 06/05/20 at 0016  Med List Status: <None>   Medication Last Dose Status   tobramycin (TOBREX) 0.3 % Ointment  "ophthalmic ointment  Active                ALLERGIES  Allergies   Allergen Reactions   • Nkda [No Known Drug Allergy]        PHYSICAL EXAM  VITAL SIGNS: /90   Pulse 99   Temp 36.8 °C (98.2 °F) (Temporal)   Resp 18   Ht 1.702 m (5' 7\")   Wt 61.2 kg (135 lb)   SpO2 99%   BMI 21.14 kg/m²    Pulse ox interpretation: I interpret this pulse ox as normal.  Constitutional: Alert in mild distress  HENT: Normocephalic atraumatic, MMM  Eyes: PER, Conjunctiva normal, Non-icteric.   Neck: Normal range of motion, No tenderness, Supple, No stridor.   Cardiovascular: Regular rate and rhythm, no murmurs.   Thorax & Lungs: Normal breath sounds, No respiratory distress, No wheezing, No chest tenderness.   Abdomen: Bowel sounds normal, Soft, No tenderness, No pulsatile masses. No peritoneal signs.  Skin: Warm, Dry, No erythema, No rash.   Back: No bony tenderness, No CVA tenderness.   Extremities/MSK: Intact distal pulses, No edema, No tenderness, No cyanosis, no major deformities noted  Neurologic: Alert and oriented x3, No focal deficits noted.   Psychiatric:Judgment normal, mildly anxious      DIFFERENTIAL DIAGNOSIS AND WORK UP PLAN    This is a 30 y.o. female who presents with opioid withdrawal although she is no active hallucination, cooperative currently does not have any suicidal or homicidal ideations knows where her resources are I offered to potentially care to an inpatient rehab facility and she does not wish that to happen she really wants something to be done outpatient.    DIAGNOSTIC STUDIES / PROCEDURES    LABS  Pertinent Lab Findings    Labs Reviewed   POC BREATHALIZER - Abnormal; Notable for the following components:       Result Value    POC Breathalizer 0.034 (*)     All other components within normal limits       COURSE & MEDICAL DECISION MAKING  Pertinent Labs & Imaging studies reviewed. (See chart for details)    1:16 AM  Spoke with behavioral health, again the patient is refusing inpatient care but " understands that the [facility] clinic does do some outpatient treatment.  She given a dose of clonidine and Zofran here in the ED and sent home with a prescription for clonidine for few days as well as Zofran.  She will return to the ED for any new or worsening issues including thoughts of hurting herself at this time does not meet criteria for legal hold.    The patient will return for new or worsening symptoms and is stable at the time of discharge.    The patient is referred to a primary physician for blood pressure management, diabetic screening, and for all other preventative health concerns.    DISPOSITION:  Patient will be discharged home in stable condition.    FOLLOW UP:  Trevon Cobian M.D.  [address]  [facility] [address]  [phone]    Schedule an appointment as soon as possible for a visit       [facility], Emergency Dept  [address]  [place] [address] [phone]    If symptoms worsen      OUTPATIENT MEDICATIONS:  New Prescriptions    CLONIDINE (CATAPRES) 0.1 MG TAB    Take 1 Tab by mouth 3 times a day as needed (anxiety, withdrawal symptoms) for up to 3 days.    ONDANSETRON (ZOFRAN ODT) 4 MG TABLET DISPERSIBLE    Take 1 Tab by mouth every 6 hours as needed.           FINAL IMPRESSION  1. Opioid withdrawal (HCC)     2. Suicidal thoughts             Electronically signed by: Britt Gerardo M.D., 6/5/2020 12:25 AM    This dictation has been created using voice recognition software and/or scribes. The accuracy of the dictation is limited by the abilities of the software and the expertise of the scribes. I expect there may be some errors of grammar and possibly content. I made every attempt to manually correct the errors within my dictation. However, errors related to voice recognition software and/or scribes may still exist and should be interpreted within the appropriate context.

## 2020-07-15 ENCOUNTER — HOSPITAL ENCOUNTER (EMERGENCY)
Facility: MEDICAL CENTER | Age: 30
End: 2020-07-15
Attending: EMERGENCY MEDICINE
Payer: MEDICAID

## 2020-07-15 ENCOUNTER — APPOINTMENT (OUTPATIENT)
Dept: RADIOLOGY | Facility: MEDICAL CENTER | Age: 30
End: 2020-07-15
Attending: EMERGENCY MEDICINE
Payer: MEDICAID

## 2020-07-15 VITALS
HEART RATE: 99 BPM | DIASTOLIC BLOOD PRESSURE: 94 MMHG | RESPIRATION RATE: 20 BRPM | BODY MASS INDEX: 20.4 KG/M2 | HEIGHT: 67 IN | TEMPERATURE: 98.6 F | SYSTOLIC BLOOD PRESSURE: 138 MMHG | WEIGHT: 130 LBS | OXYGEN SATURATION: 97 %

## 2020-07-15 DIAGNOSIS — L03.116 CELLULITIS OF LEFT LOWER EXTREMITY: ICD-10-CM

## 2020-07-15 DIAGNOSIS — S09.90XA CLOSED HEAD INJURY, INITIAL ENCOUNTER: ICD-10-CM

## 2020-07-15 PROCEDURE — A9270 NON-COVERED ITEM OR SERVICE: HCPCS

## 2020-07-15 PROCEDURE — A9270 NON-COVERED ITEM OR SERVICE: HCPCS | Performed by: EMERGENCY MEDICINE

## 2020-07-15 PROCEDURE — 700102 HCHG RX REV CODE 250 W/ 637 OVERRIDE(OP): Performed by: EMERGENCY MEDICINE

## 2020-07-15 PROCEDURE — 99284 EMERGENCY DEPT VISIT MOD MDM: CPT

## 2020-07-15 PROCEDURE — 700102 HCHG RX REV CODE 250 W/ 637 OVERRIDE(OP)

## 2020-07-15 PROCEDURE — 70450 CT HEAD/BRAIN W/O DYE: CPT

## 2020-07-15 RX ORDER — KETOROLAC TROMETHAMINE 30 MG/ML
15 INJECTION, SOLUTION INTRAMUSCULAR; INTRAVENOUS ONCE
Status: DISCONTINUED | OUTPATIENT
Start: 2020-07-15 | End: 2020-07-15

## 2020-07-15 RX ORDER — IBUPROFEN 200 MG
400 TABLET ORAL ONCE
Status: COMPLETED | OUTPATIENT
Start: 2020-07-15 | End: 2020-07-15

## 2020-07-15 RX ORDER — CEPHALEXIN 500 MG/1
500 CAPSULE ORAL 4 TIMES DAILY
Qty: 28 CAP | Refills: 0 | Status: SHIPPED | OUTPATIENT
Start: 2020-07-15 | End: 2020-07-20

## 2020-07-15 RX ORDER — ACETAMINOPHEN 325 MG/1
650 TABLET ORAL ONCE
Status: COMPLETED | OUTPATIENT
Start: 2020-07-15 | End: 2020-07-15

## 2020-07-15 RX ORDER — CEPHALEXIN 500 MG/1
500 CAPSULE ORAL ONCE
Status: COMPLETED | OUTPATIENT
Start: 2020-07-15 | End: 2020-07-15

## 2020-07-15 RX ADMIN — ACETAMINOPHEN 650 MG: 325 TABLET, FILM COATED ORAL at 06:10

## 2020-07-15 RX ADMIN — IBUPROFEN 400 MG: 200 TABLET, FILM COATED ORAL at 06:38

## 2020-07-15 RX ADMIN — CEPHALEXIN 500 MG: 500 CAPSULE ORAL at 06:10

## 2020-07-15 NOTE — ED NOTES
Pt ambulated unassisted with steady gait to rm 2 accompanied by EMS. Pt complains of MGLF at 0300 today. No signs of obvious trauma. Pupils PERRLA. Pt states she slipped on water, fell, and hit her head. - LOC.

## 2020-07-15 NOTE — ED PROVIDER NOTES
ED Provider Note    CHIEF COMPLAINT  Chief Complaint   Patient presents with   • GLF     slipped on water fell in bathroom hit head 0300, no obvious signs of trauma       HPI  Liv Mendez is a 30 y.o. female who presents to the emergency department with a head injury.  Patient slipped on some water in the bathroom.  Fell hit the back of her head.  Days did not blackout.  She has had a severe throbbing headache since then.  Gradually worsening.  Associated nausea vomited once.  No weakness numbness neurologic symptoms.  Denies neck pain or other trauma.    Patient is a history of picking behavior.  She has a wound on her left lower leg that looks red.  Started a few days ago.  She is been putting Band-Aids on it without relief.    Tetanus vaccination is up-to-date.    Reviewed chart.  Patient with a history of opioid abuse and withdrawal    REVIEW OF SYSTEMS  As per HPI, otherwise a 10 point review of systems is negative    PAST MEDICAL HISTORY  Past Medical History:   Diagnosis Date   • Anxiety    • CONCUSSION 1/8/2010   • Depression        SOCIAL HISTORY  Social History     Tobacco Use   • Smoking status: Current Every Day Smoker     Packs/day: 0.50     Years: 3.00     Pack years: 1.50     Types: Cigarettes   • Smokeless tobacco: Never Used   • Tobacco comment: 1/2ppd   Substance Use Topics   • Alcohol use: No     Comment: soical   • Drug use: Yes     Types: Intravenous     Comment: heroin last use x1week ago       SURGICAL HISTORY  Past Surgical History:   Procedure Laterality Date   • DANIEL BY LAPAROSCOPY  6/17/08    Performed by APRIL BAUTISTA at SURGERY SAME DAY [facility] ORS       CURRENT MEDICATIONS  Home Medications     Reviewed by Rajan Carmichael R.N. (Registered Nurse) on 07/15/20 at 0563  Med List Status: Not Addressed   Medication Last Dose Status   ondansetron (ZOFRAN ODT) 4 MG TABLET DISPERSIBLE  Active   tobramycin (TOBREX) 0.3 % Ointment ophthalmic ointment  Active           "      ALLERGIES  Allergies   Allergen Reactions   • Nkda [No Known Drug Allergy]        PHYSICAL EXAM  VITAL SIGNS: /93   Pulse 95   Temp 37 °C (98.6 °F) (Temporal)   Resp 16   Ht 1.702 m (5' 7\")   Wt 59 kg (130 lb)   SpO2 97%   BMI 20.36 kg/m²    Constitutional: Awake and alert  HENT: Head without evidence of objective trauma  Eyes: Normal inspection  Neck: Supple  Cardiovascular: Normal heart rate, Normal rhythm.  Symmetric peripheral pulses.   Thorax & Lungs: No respiratory distress, No wheezing, No rales, No rhonchi, No chest tenderness.   Abdomen: Bowel sounds normal, soft, non-distended, nontender, no mass  Skin: Couple areas of picked skin.  On her left shin there is about 5 cm area of erythema.  No fluctuance or abscess.  Back: No tenderness, No CVA tenderness.   Extremities: No clubbing, cyanosis, edema, no Homans or cords   Neurologic: Awake alert oriented.  Symmetric motor and sensory  Psychiatric: Anxious appearing    RADIOLOGY/PROCEDURES  CT-HEAD W/O   Final Result         1.  No acute intracranial abnormality.           Imaging is interpreted by radiologist      Medications   ketorolac (TORADOL) injection 15 mg (has no administration in time range)   cephALEXin (KEFLEX) capsule 500 mg (500 mg Oral Given 7/15/20 0610)   acetaminophen (TYLENOL) tablet 650 mg (650 mg Oral Given 7/15/20 0610)       COURSE & MEDICAL DECISION MAKING  Patient presents with severe headache nausea and vomiting after head trauma.  CT head was indicated.  This was ordered.  She was given Tylenol for headache.  She has evidence of a superficial cellulitis related to skin picking of her left lower leg.  She was given Keflex orally.    CT returned negative.  Patient complained of severe headache and began screaming out for pain medication.  We ordered Toradol IM.  She was unhappy about not receiving opiates.  With her history of substance abuse and addiction opiates would not be indicated and I discussed this with her.  " Patient is given instructions regarding concussion.  Advised Tylenol as needed for pain.  Prescribed Keflex to take for 1 week.  She is to return to the ER for any signs of worsening infection or concern.    FINAL IMPRESSION  1.  Concussion without loss of consciousness  2.  Wound infection/cellulitis left lower extremity  3.  History of opiate addiction with drug-seeking behavior      This dictation was created using voice recognition software. The accuracy of the dictation is limited to the abilities of the software.  The nursing notes were reviewed and certain aspects of this information were incorporated into this note.      Electronically signed by: Mike Figueredo M.D., 7/15/2020 6:16 AM

## 2020-07-15 NOTE — ED TRIAGE NOTES
Pt ambulated unassisted with steady gait to rm 2 accompanied by EMS. Pt complains of MGLF at 0300 today. No signs of obvious trauma. Pupils PERRLA. Pt states she slipped on water, fell, and hit her head. - LOC. Pt states last drug use via IV heroin x 1 week ago.

## 2020-07-15 NOTE — ED NOTES
Pt medicated per MAR. Pt discharge home. Pt given discharge instructions and prescription. Pt verbalized understanding, all questions answered, vss upon d/c. Pt steady gait during ambulation.

## 2025-02-09 ENCOUNTER — HOSPITAL ENCOUNTER (EMERGENCY)
Facility: MEDICAL CENTER | Age: 35
End: 2025-02-10
Attending: EMERGENCY MEDICINE
Payer: MEDICAID

## 2025-02-09 DIAGNOSIS — F11.93 OPIATE WITHDRAWAL (HCC): ICD-10-CM

## 2025-02-09 PROCEDURE — 99284 EMERGENCY DEPT VISIT MOD MDM: CPT

## 2025-02-10 VITALS
HEIGHT: 67 IN | RESPIRATION RATE: 24 BRPM | DIASTOLIC BLOOD PRESSURE: 101 MMHG | TEMPERATURE: 97.5 F | HEART RATE: 90 BPM | OXYGEN SATURATION: 99 % | WEIGHT: 120.37 LBS | SYSTOLIC BLOOD PRESSURE: 173 MMHG | BODY MASS INDEX: 18.89 KG/M2

## 2025-02-10 PROCEDURE — 700102 HCHG RX REV CODE 250 W/ 637 OVERRIDE(OP): Mod: UD | Performed by: EMERGENCY MEDICINE

## 2025-02-10 PROCEDURE — 700111 HCHG RX REV CODE 636 W/ 250 OVERRIDE (IP): Mod: UD | Performed by: EMERGENCY MEDICINE

## 2025-02-10 PROCEDURE — A9270 NON-COVERED ITEM OR SERVICE: HCPCS | Mod: UD | Performed by: EMERGENCY MEDICINE

## 2025-02-10 RX ORDER — ONDANSETRON 4 MG/1
4 TABLET, ORALLY DISINTEGRATING ORAL ONCE
Status: COMPLETED | OUTPATIENT
Start: 2025-02-10 | End: 2025-02-10

## 2025-02-10 RX ORDER — BUPRENORPHINE 8 MG/1
8 TABLET SUBLINGUAL DAILY
Qty: 14 TABLET | Refills: 0 | Status: SHIPPED | OUTPATIENT
Start: 2025-02-10 | End: 2025-02-24

## 2025-02-10 RX ORDER — BUPRENORPHINE 8 MG/1
8 TABLET SUBLINGUAL ONCE
Status: COMPLETED | OUTPATIENT
Start: 2025-02-10 | End: 2025-02-10

## 2025-02-10 RX ADMIN — ONDANSETRON 4 MG: 4 TABLET, ORALLY DISINTEGRATING ORAL at 01:17

## 2025-02-10 RX ADMIN — BUPRENORPHINE HCL 8 MG: 8 TABLET SUBLINGUAL at 00:39

## 2025-02-10 RX ADMIN — BUPRENORPHINE HCL 8 MG: 8 TABLET SUBLINGUAL at 00:21

## 2025-02-10 NOTE — ED PROVIDER NOTES
ER Provider Note    Scribed for Dr. Tu Davila M.D. by Katty Alston. 2/9/2025  11:58 PM    Primary Care Provider: None pertinent     CHIEF COMPLAINT  Chief Complaint   Patient presents with    Drug Withdrawal     Last use of fentanyl 80 hours ago, c/o muscle pain, restless legs and insomnia.         EXTERNAL RECORDS REVIEWED  Inpatient Notes Patient admitted in 2017 for cellulitis of periorbital region of both eyes. Put on antibiotics.    HPI/ROS    Liv Mendez is a 34 y.o. female who presents to the ED for evaluation of drug withdrawal. Patient explains that she is going through withdrawals. She reports muscle cramps and nausea. She has a history of withdrawal, and states her current symptoms are similar to her past withdrawals. No alleviating or exacerbating factors noted.      PAST MEDICAL HISTORY  Past Medical History:   Diagnosis Date    Anxiety     CONCUSSION 1/8/2010    Depression        SURGICAL HISTORY  Past Surgical History:   Procedure Laterality Date    DANIEL BY LAPAROSCOPY  6/17/08    Performed by APRIL BAUTISTA at SURGERY SAME DAY [facility]       FAMILY HISTORY  Family History   Problem Relation Age of Onset    Thyroid Mother     Diabetes Mother     Cancer Neg Hx     Non-contributory Neg Hx     Hyperlipidemia Neg Hx     Hypertension Neg Hx        SOCIAL HISTORY   reports that she has been smoking cigarettes. She has a 1.5 pack-year smoking history. She has never used smokeless tobacco. She reports current drug use. Drug: Intravenous. She reports that she does not drink alcohol.    CURRENT MEDICATIONS  Previous Medications    ONDANSETRON (ZOFRAN ODT) 4 MG TABLET DISPERSIBLE    Take 1 Tab by mouth every 6 hours as needed.    TOBRAMYCIN (TOBREX) 0.3 % OINTMENT OPHTHALMIC OINTMENT    Place 1 Application in right eye 4 times a day. (Place small amount on inner lower lid of right eye)       ALLERGIES  Nkda [no known drug allergy]    PHYSICAL EXAM  BP (!) 154/111   Pulse (!) 112   Temp 36.4 °C  "(97.5 °F) (Temporal)   Resp 18   Ht 1.702 m (5' 7\")   Wt 54.6 kg (120 lb 5.9 oz)   LMP 02/09/2025   SpO2 98%   BMI 18.85 kg/m²   Constitutional: Alert in no apparent distress. Mildly anxious.  HENT: No signs of trauma, Bilateral external ears normal, Nose normal.   Eyes: Pupils are equal and reactive, Conjunctiva normal, Non-icteric.   Neck: Normal range of motion, No tenderness, Supple,   Cardiovascular: Mildly tachycardic rate and rhythm, no murmurs.   Thorax & Lungs: Normal breath sounds, No respiratory distress, No wheezing, No chest tenderness.   Abdomen: Bowel sounds normal, Soft, No tenderness,   Skin: Warm, Dry, No erythema, No rash.   Back: No bony tenderness, No CVA tenderness.   Extremities: No edema, No tenderness, No cyanosis  Neurologic:  Alert, Grossly non-focal.   Psychiatric: Affect normal       COURSE & MEDICAL DECISION MAKING    ED Observation Status? No; Patient does not meet criteria for ED Observation.     INITIAL ASSESSMENT AND PLAN  Care Narrative:       11:58 PM - Patient seen and evaluated at bedside. Discussed plan of care, including administering buprenorphine. Patient agrees to plan of care. Patient will be treated with buprenorphine sublingual 8 mg PO for her symptoms.         ADDITIONAL PROBLEM LIST AND DISPOSITION   Patient with withdrawal from opiates.  At this time she is in mild distress.  She has mild tachycardia.  She is able to eat and drink but not feeling great.  No concerning abdominal findings or chest findings to necessitate labs or imaging.  Subutex given to the patient.  She is feeling somewhat improved.  A second dose was given.  I also will give her a dose of Subutex for a day for 14 days.  Will discharge patient home with strict return precautions and follow-up.               DISPOSITION AND DISCUSSIONS  I have discussed management of the patient with the following physicians and PAULINA's: None    Discussion of management with other [facility] or appropriate source(s): " None     Escalation of care considered, and ultimately not performed: acute inpatient care management, however at this time, the patient is most appropriate for outpatient management.    Barriers to care at this time, including but not limited to:  Patient is drug addicted .     Decision tools and prescription drugs considered including, but not limited to:  Subutex for home. .        FINAL IMPRESSION   1. Opiate withdrawal (HCC)         Katty AGUILAR (Finesse), am scribing for, and in the presence of, Tu Davila M.D..    Electronically signed by: Katty Alston (Finesse), 2/9/2025    Tu AGUILAR M.D. personally performed the services described in this documentation, as scribed by Katty Alston in my presence, and it is both accurate and complete.    The note accurately reflects work and decisions made by me.  Tu Davila M.D.  2/10/2025  1:03 AM

## 2025-02-10 NOTE — ED NOTES
Patient discharged home per ERP.  Discharge teaching and education discussed with patient. POC discussed.   Patient verbalized understanding of discharge teaching and education. No other questions at this time.     RX for withdrawal sent to pt's pharmacy.     VSS. Patient alert and oriented. Patient arranged ride for self. Able to ambulate off unit safely with steady gait.

## 2025-02-10 NOTE — ED NOTES
Bedside report received from off going RN/tech: tip rn, assumed care of patient.  POC discussed with patient. Call light within reach, all needs addressed at this time.       Fall risk interventions in place: Patient's personal possessions are with in their safe reach and Keep floor surfaces clean and dry (all applicable per [place] Fall risk assessment)   Continuous monitoring: Cardiac Leads, Pulse Ox, or Blood Pressure  IVF/IV medications: Not Applicable   Oxygen: Room Air  Bedside sitter: Not Applicable   Isolation: Not Applicable

## 2025-02-10 NOTE — ED TRIAGE NOTES
Chief Complaint   Patient presents with    Drug Withdrawal     Last use of fentanyl 80 hours ago, c/o muscle pain, restless legs and insomnia.       Vitals:    02/09/25 2303   BP: (!) 154/111   Pulse: (!) 112   Resp: 18   Temp: 36.4 °C (97.5 °F)   SpO2: 98%     Patient ambulatory to triage for above complaint. Patient A&Ox4, GCS 15, patient speaking in full sentences. Equal and unlabored respirations. Patient educated on triage process and encouraged to notify staff if condition worsens.  Patient returned to the lobby in stable condition.